# Patient Record
Sex: MALE | Race: WHITE | NOT HISPANIC OR LATINO | Employment: OTHER | URBAN - METROPOLITAN AREA
[De-identification: names, ages, dates, MRNs, and addresses within clinical notes are randomized per-mention and may not be internally consistent; named-entity substitution may affect disease eponyms.]

---

## 2018-11-28 ENCOUNTER — OFFICE VISIT (OUTPATIENT)
Dept: OPHTHALMOLOGY | Facility: CLINIC | Age: 58
End: 2018-11-28
Payer: COMMERCIAL

## 2018-11-28 DIAGNOSIS — H52.7 REFRACTION DISORDER: ICD-10-CM

## 2018-11-28 DIAGNOSIS — H04.123 DRY EYE SYNDROME, BILATERAL: ICD-10-CM

## 2018-11-28 DIAGNOSIS — H02.834 DERMATOCHALASIS OF BOTH UPPER EYELIDS: Primary | ICD-10-CM

## 2018-11-28 DIAGNOSIS — H25.12 NUCLEAR SENILE CATARACT OF LEFT EYE: ICD-10-CM

## 2018-11-28 DIAGNOSIS — H02.831 DERMATOCHALASIS OF BOTH UPPER EYELIDS: Primary | ICD-10-CM

## 2018-11-28 DIAGNOSIS — H25.11 NUCLEAR SENILE CATARACT OF RIGHT EYE: ICD-10-CM

## 2018-11-28 PROCEDURE — 99999 PR PBB SHADOW E&M-NEW PATIENT-LVL II: CPT | Mod: PBBFAC,,, | Performed by: OPHTHALMOLOGY

## 2018-11-28 PROCEDURE — 92015 DETERMINE REFRACTIVE STATE: CPT | Mod: S$GLB,,, | Performed by: OPHTHALMOLOGY

## 2018-11-28 PROCEDURE — 92004 COMPRE OPH EXAM NEW PT 1/>: CPT | Mod: S$GLB,,, | Performed by: OPHTHALMOLOGY

## 2018-11-28 RX ORDER — ESOMEPRAZOLE MAGNESIUM 40 MG/1
40 CAPSULE, DELAYED RELEASE ORAL
COMMUNITY
Start: 2018-11-12

## 2018-11-28 RX ORDER — FLUTICASONE PROPIONATE 50 MCG
2 SPRAY, SUSPENSION (ML) NASAL
COMMUNITY
Start: 2018-05-17

## 2018-11-28 RX ORDER — SOTALOL HYDROCHLORIDE 80 MG/1
80 TABLET ORAL
COMMUNITY
Start: 2017-08-04

## 2018-11-28 RX ORDER — PRAVASTATIN SODIUM 80 MG/1
80 TABLET ORAL
COMMUNITY
Start: 2018-11-12

## 2018-11-28 RX ORDER — TRAMADOL HYDROCHLORIDE 50 MG/1
50 TABLET ORAL
Status: ON HOLD | COMMUNITY
Start: 2018-11-12 | End: 2019-01-12 | Stop reason: HOSPADM

## 2018-11-28 RX ORDER — DEXTROAMPHETAMINE SACCHARATE, AMPHETAMINE ASPARTATE, DEXTROAMPHETAMINE SULFATE AND AMPHETAMINE SULFATE 2.5; 2.5; 2.5; 2.5 MG/1; MG/1; MG/1; MG/1
10 TABLET ORAL
COMMUNITY
Start: 2018-10-19 | End: 2019-03-07

## 2018-11-28 RX ORDER — ALPRAZOLAM 1 MG/1
1 TABLET ORAL
COMMUNITY
Start: 2018-11-12 | End: 2019-11-12

## 2018-11-28 RX ORDER — NIACIN 750 MG/1
750 TABLET, EXTENDED RELEASE ORAL
COMMUNITY

## 2018-11-28 RX ORDER — ASPIRIN 81 MG/1
81 TABLET ORAL DAILY
COMMUNITY

## 2018-11-28 RX ORDER — DICYCLOMINE HYDROCHLORIDE 10 MG/1
10 CAPSULE ORAL
COMMUNITY
Start: 2018-09-11 | End: 2019-04-22 | Stop reason: SDUPTHER
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
## 2018-12-05 ENCOUNTER — OFFICE VISIT (OUTPATIENT)
Dept: OPHTHALMOLOGY | Facility: CLINIC | Age: 58
End: 2018-12-05
Payer: COMMERCIAL

## 2018-12-05 DIAGNOSIS — H25.12 NUCLEAR SENILE CATARACT OF LEFT EYE: ICD-10-CM

## 2018-12-05 DIAGNOSIS — H02.831 DERMATOCHALASIS OF BOTH UPPER EYELIDS: Primary | ICD-10-CM

## 2018-12-05 DIAGNOSIS — H52.7 REFRACTION DISORDER: ICD-10-CM

## 2018-12-05 DIAGNOSIS — H02.834 DERMATOCHALASIS OF BOTH UPPER EYELIDS: Primary | ICD-10-CM

## 2018-12-05 DIAGNOSIS — H25.11 NUCLEAR SENILE CATARACT OF RIGHT EYE: ICD-10-CM

## 2018-12-05 DIAGNOSIS — H04.123 DRY EYE SYNDROME, BILATERAL: ICD-10-CM

## 2018-12-05 PROCEDURE — 99999 PR PBB SHADOW E&M-EST. PATIENT-LVL II: CPT | Mod: PBBFAC,,, | Performed by: OPHTHALMOLOGY

## 2018-12-05 PROCEDURE — 92083 EXTENDED VISUAL FIELD XM: CPT | Mod: S$GLB,,, | Performed by: OPHTHALMOLOGY

## 2018-12-05 PROCEDURE — 92012 INTRM OPH EXAM EST PATIENT: CPT | Mod: S$GLB,,, | Performed by: OPHTHALMOLOGY

## 2018-12-05 RX ORDER — ERYTHROMYCIN 5 MG/G
OINTMENT OPHTHALMIC 4 TIMES DAILY
Qty: 1 TUBE | Refills: 3 | Status: SHIPPED | OUTPATIENT
Start: 2018-12-05 | End: 2018-12-12

## 2018-12-05 NOTE — PROGRESS NOTES
HPI     Pt here for lid HVF and MR jay. Pt states that his eyes are dry this   morning. They were able to put some AT's in, and that helped his eyes. [facility]   stable. No gtts.     NP  H/O RP -DAD ,UNCLE, AUNT  FB REMOVAL OU MANY YEARS  DERMATO OU    Last edited by Osiel Morgan, Patient Care Assistant on 12/5/2018  9:46   AM. (History)            Assessment /Plan     For exam results, see Encounter Report.      ICD-10-CM ICD-9-CM    1. Dermatochalasis of both upper eyelids H02.831 374.87 [facility] Visual Field - OU - Extended - Both Eyes Done today   Visually significant would benefit from surgery   RBA with sx discussed with pt and family member     Pt needs to stop ASA and any other blood thinners  2 weeks prior to surgery     H02.834     2. Nuclear senile cataract of right eye H25.11 366.16 Follow    3. Nuclear senile cataract of left eye H25.12 366.16 Follow    4. Dry eye syndrome, bilateral H04.123 375.15               Book Bilateral Blepharoplasty in January 2019     Lid Photos Taken today

## 2019-01-07 ENCOUNTER — HOSPITAL ENCOUNTER (INPATIENT)
Facility: HOSPITAL | Age: 59
LOS: 5 days | Discharge: HOME | DRG: 418 | End: 2019-01-12
Attending: EMERGENCY MEDICINE | Admitting: HOSPITALIST
Payer: MEDICARE

## 2019-01-07 DIAGNOSIS — K85.90 ACUTE PANCREATITIS, UNSPECIFIED COMPLICATION STATUS, UNSPECIFIED PANCREATITIS TYPE: Primary | ICD-10-CM

## 2019-01-07 DIAGNOSIS — K85.10 ACUTE BILIARY PANCREATITIS, UNSPECIFIED COMPLICATION STATUS: ICD-10-CM

## 2019-01-07 DIAGNOSIS — K85.90 ACUTE PANCREATITIS: ICD-10-CM

## 2019-01-07 DIAGNOSIS — K81.9 CHOLECYSTITIS: ICD-10-CM

## 2019-01-07 DIAGNOSIS — K80.00 CALCULUS OF GALLBLADDER WITH ACUTE CHOLECYSTITIS WITHOUT OBSTRUCTION: ICD-10-CM

## 2019-01-07 DIAGNOSIS — R10.13 EPIGASTRIC PAIN: ICD-10-CM

## 2019-01-07 DIAGNOSIS — R10.9 ABDOMINAL PAIN: ICD-10-CM

## 2019-01-07 DIAGNOSIS — K82.8 THICKENING OF WALL OF GALLBLADDER: ICD-10-CM

## 2019-01-07 LAB
ALBUMIN SERPL BCP-MCNC: 4 G/DL
ALP SERPL-CCNC: 111 U/L
ALT SERPL W/O P-5'-P-CCNC: 124 U/L
ANION GAP SERPL CALC-SCNC: 12 MMOL/L
AST SERPL-CCNC: 191 U/L
BASOPHILS # BLD AUTO: 0.01 K/UL
BASOPHILS NFR BLD: 0.1 %
BILIRUB SERPL-MCNC: 2.5 MG/DL
BUN SERPL-MCNC: 18 MG/DL
CALCIUM SERPL-MCNC: 9.3 MG/DL
CHLORIDE SERPL-SCNC: 106 MMOL/L
CO2 SERPL-SCNC: 21 MMOL/L
CREAT SERPL-MCNC: 1.2 MG/DL
DIFFERENTIAL METHOD: ABNORMAL
EOSINOPHIL # BLD AUTO: 0.1 K/UL
EOSINOPHIL NFR BLD: 0.5 %
ERYTHROCYTE [DISTWIDTH] IN BLOOD BY AUTOMATED COUNT: 14 %
EST. GFR  (AFRICAN AMERICAN): >60 ML/MIN/1.73 M^2
EST. GFR  (NON AFRICAN AMERICAN): >60 ML/MIN/1.73 M^2
GLUCOSE SERPL-MCNC: 91 MG/DL
HCT VFR BLD AUTO: 43.9 %
HGB BLD-MCNC: 15.2 G/DL
LIPASE SERPL-CCNC: >1000 U/L
LYMPHOCYTES # BLD AUTO: 0.4 K/UL
LYMPHOCYTES NFR BLD: 3.9 %
MCH RBC QN AUTO: 31.3 PG
MCHC RBC AUTO-ENTMCNC: 34.6 G/DL
MCV RBC AUTO: 90 FL
MONOCYTES # BLD AUTO: 0.1 K/UL
MONOCYTES NFR BLD: 1.2 %
NEUTROPHILS # BLD AUTO: 8.9 K/UL
NEUTROPHILS NFR BLD: 94.3 %
PLATELET # BLD AUTO: 143 K/UL
PMV BLD AUTO: 9 FL
POTASSIUM SERPL-SCNC: 3.5 MMOL/L
PROT SERPL-MCNC: 7 G/DL
RBC # BLD AUTO: 4.86 M/UL
SODIUM SERPL-SCNC: 139 MMOL/L
TROPONIN I SERPL DL<=0.01 NG/ML-MCNC: <0.006 NG/ML
WBC # BLD AUTO: 9.45 K/UL

## 2019-01-07 PROCEDURE — 93005 ELECTROCARDIOGRAM TRACING: CPT

## 2019-01-07 PROCEDURE — 63600175 PHARM REV CODE 636 W HCPCS: Performed by: EMERGENCY MEDICINE

## 2019-01-07 PROCEDURE — 96375 TX/PRO/DX INJ NEW DRUG ADDON: CPT

## 2019-01-07 PROCEDURE — 93010 EKG 12-LEAD: ICD-10-PCS | Mod: ,,, | Performed by: INTERNAL MEDICINE

## 2019-01-07 PROCEDURE — 80053 COMPREHEN METABOLIC PANEL: CPT

## 2019-01-07 PROCEDURE — C9113 INJ PANTOPRAZOLE SODIUM, VIA: HCPCS | Performed by: EMERGENCY MEDICINE

## 2019-01-07 PROCEDURE — 84484 ASSAY OF TROPONIN QUANT: CPT

## 2019-01-07 PROCEDURE — 11000001 HC ACUTE MED/SURG PRIVATE ROOM

## 2019-01-07 PROCEDURE — 83690 ASSAY OF LIPASE: CPT

## 2019-01-07 PROCEDURE — 25000003 PHARM REV CODE 250: Performed by: NURSE PRACTITIONER

## 2019-01-07 PROCEDURE — 85025 COMPLETE CBC W/AUTO DIFF WBC: CPT

## 2019-01-07 PROCEDURE — 96374 THER/PROPH/DIAG INJ IV PUSH: CPT

## 2019-01-07 PROCEDURE — 25500020 PHARM REV CODE 255: Performed by: EMERGENCY MEDICINE

## 2019-01-07 PROCEDURE — 96361 HYDRATE IV INFUSION ADD-ON: CPT

## 2019-01-07 PROCEDURE — 82150 ASSAY OF AMYLASE: CPT

## 2019-01-07 PROCEDURE — 93010 ELECTROCARDIOGRAM REPORT: CPT | Mod: ,,, | Performed by: INTERNAL MEDICINE

## 2019-01-07 PROCEDURE — 25000003 PHARM REV CODE 250: Performed by: EMERGENCY MEDICINE

## 2019-01-07 PROCEDURE — 99285 EMERGENCY DEPT VISIT HI MDM: CPT | Mod: 25

## 2019-01-07 RX ORDER — PANTOPRAZOLE SODIUM 40 MG/10ML
40 INJECTION, POWDER, LYOPHILIZED, FOR SOLUTION INTRAVENOUS
Status: COMPLETED | OUTPATIENT
Start: 2019-01-07 | End: 2019-01-07

## 2019-01-07 RX ORDER — ONDANSETRON 2 MG/ML
8 INJECTION INTRAMUSCULAR; INTRAVENOUS
Status: COMPLETED | OUTPATIENT
Start: 2019-01-07 | End: 2019-01-07

## 2019-01-07 RX ORDER — MORPHINE SULFATE 10 MG/ML
4 INJECTION INTRAMUSCULAR; INTRAVENOUS; SUBCUTANEOUS
Status: COMPLETED | OUTPATIENT
Start: 2019-01-07 | End: 2019-01-07

## 2019-01-07 RX ADMIN — SODIUM CHLORIDE 1000 ML: 0.9 INJECTION, SOLUTION INTRAVENOUS at 09:01

## 2019-01-07 RX ADMIN — DICYCLOMINE HYDROCHLORIDE 50 ML: 10 SOLUTION ORAL at 09:01

## 2019-01-07 RX ADMIN — PANTOPRAZOLE SODIUM 40 MG: 40 INJECTION, POWDER, FOR SOLUTION INTRAVENOUS at 10:01

## 2019-01-07 RX ADMIN — IOHEXOL 75 ML: 350 INJECTION, SOLUTION INTRAVENOUS at 11:01

## 2019-01-07 RX ADMIN — MORPHINE SULFATE 4 MG: 10 INJECTION INTRAVENOUS at 09:01

## 2019-01-07 RX ADMIN — ONDANSETRON 8 MG: 2 INJECTION, SOLUTION INTRAMUSCULAR; INTRAVENOUS at 09:01

## 2019-01-08 PROBLEM — R74.01 TRANSAMINITIS: Status: ACTIVE | Noted: 2019-01-08

## 2019-01-08 PROBLEM — K85.90 ACUTE PANCREATITIS: Status: ACTIVE | Noted: 2019-01-08

## 2019-01-08 PROBLEM — Z72.0 TOBACCO ABUSE: Status: ACTIVE | Noted: 2019-01-08

## 2019-01-08 LAB
ALBUMIN SERPL BCP-MCNC: 3.5 G/DL
ALP SERPL-CCNC: 96 U/L
ALT SERPL W/O P-5'-P-CCNC: 105 U/L
AMPHET+METHAMPHET UR QL: NORMAL
AMYLASE SERPL-CCNC: 1182 U/L
ANION GAP SERPL CALC-SCNC: 12 MMOL/L
AST SERPL-CCNC: 130 U/L
BARBITURATES UR QL SCN>200 NG/ML: NEGATIVE
BASOPHILS # BLD AUTO: 0.01 K/UL
BASOPHILS NFR BLD: 0.1 %
BENZODIAZ UR QL SCN>200 NG/ML: NORMAL
BILIRUB SERPL-MCNC: 1.4 MG/DL
BILIRUB UR QL STRIP: NEGATIVE
BUN SERPL-MCNC: 16 MG/DL
BZE UR QL SCN: NEGATIVE
CALCIUM SERPL-MCNC: 8.7 MG/DL
CANNABINOIDS UR QL SCN: NORMAL
CHLORIDE SERPL-SCNC: 104 MMOL/L
CHOLEST SERPL-MCNC: 178 MG/DL
CHOLEST/HDLC SERPL: 4.5 {RATIO}
CLARITY UR: CLEAR
CO2 SERPL-SCNC: 22 MMOL/L
COLOR UR: YELLOW
CREAT SERPL-MCNC: 1.2 MG/DL
CREAT UR-MCNC: 84.1 MG/DL
DIFFERENTIAL METHOD: ABNORMAL
EOSINOPHIL # BLD AUTO: 0.1 K/UL
EOSINOPHIL NFR BLD: 0.5 %
ERYTHROCYTE [DISTWIDTH] IN BLOOD BY AUTOMATED COUNT: 14.4 %
EST. GFR  (AFRICAN AMERICAN): >60 ML/MIN/1.73 M^2
EST. GFR  (NON AFRICAN AMERICAN): >60 ML/MIN/1.73 M^2
ESTIMATED AVG GLUCOSE: 111 MG/DL
ETHANOL SERPL-MCNC: <10 MG/DL
GLUCOSE SERPL-MCNC: 91 MG/DL
GLUCOSE UR QL STRIP: NEGATIVE
HAV IGM SERPL QL IA: NEGATIVE
HBA1C MFR BLD HPLC: 5.5 %
HBV CORE IGM SERPL QL IA: NEGATIVE
HBV SURFACE AG SERPL QL IA: NEGATIVE
HCT VFR BLD AUTO: 39.2 %
HCV AB SERPL QL IA: NEGATIVE
HDLC SERPL-MCNC: 40 MG/DL
HDLC SERPL: 22.5 %
HGB BLD-MCNC: 13 G/DL
HGB UR QL STRIP: ABNORMAL
KETONES UR QL STRIP: NEGATIVE
LDLC SERPL CALC-MCNC: 116.6 MG/DL
LEUKOCYTE ESTERASE UR QL STRIP: NEGATIVE
LIPASE SERPL-CCNC: 812 U/L
LYMPHOCYTES # BLD AUTO: 0.7 K/UL
LYMPHOCYTES NFR BLD: 5.6 %
MAGNESIUM SERPL-MCNC: 2.1 MG/DL
MCH RBC QN AUTO: 30.4 PG
MCHC RBC AUTO-ENTMCNC: 33.2 G/DL
MCV RBC AUTO: 92 FL
METHADONE UR QL SCN>300 NG/ML: NEGATIVE
MONOCYTES # BLD AUTO: 0.5 K/UL
MONOCYTES NFR BLD: 4.2 %
NEUTROPHILS # BLD AUTO: 11.3 K/UL
NEUTROPHILS NFR BLD: 89.6 %
NITRITE UR QL STRIP: NEGATIVE
NONHDLC SERPL-MCNC: 138 MG/DL
OPIATES UR QL SCN: NORMAL
PCP UR QL SCN>25 NG/ML: NEGATIVE
PH UR STRIP: 6 [PH] (ref 5–8)
PHOSPHATE SERPL-MCNC: 3.9 MG/DL
PLATELET # BLD AUTO: 135 K/UL
PMV BLD AUTO: 9.3 FL
POTASSIUM SERPL-SCNC: 4.4 MMOL/L
PROT SERPL-MCNC: 6.4 G/DL
PROT UR QL STRIP: NEGATIVE
RBC # BLD AUTO: 4.27 M/UL
SODIUM SERPL-SCNC: 138 MMOL/L
SP GR UR STRIP: 1.01 (ref 1–1.03)
TOXICOLOGY INFORMATION: NORMAL
TRIGL SERPL-MCNC: 107 MG/DL
TSH SERPL DL<=0.005 MIU/L-ACNC: 0.61 UIU/ML
URN SPEC COLLECT METH UR: ABNORMAL
UROBILINOGEN UR STRIP-ACNC: NEGATIVE EU/DL
WBC # BLD AUTO: 12.59 K/UL

## 2019-01-08 PROCEDURE — 84100 ASSAY OF PHOSPHORUS: CPT

## 2019-01-08 PROCEDURE — 80320 DRUG SCREEN QUANTALCOHOLS: CPT

## 2019-01-08 PROCEDURE — 80053 COMPREHEN METABOLIC PANEL: CPT

## 2019-01-08 PROCEDURE — 63600175 PHARM REV CODE 636 W HCPCS: Performed by: HOSPITALIST

## 2019-01-08 PROCEDURE — 85025 COMPLETE CBC W/AUTO DIFF WBC: CPT

## 2019-01-08 PROCEDURE — C9113 INJ PANTOPRAZOLE SODIUM, VIA: HCPCS | Performed by: HOSPITALIST

## 2019-01-08 PROCEDURE — S4991 NICOTINE PATCH NONLEGEND: HCPCS | Performed by: NURSE PRACTITIONER

## 2019-01-08 PROCEDURE — 83036 HEMOGLOBIN GLYCOSYLATED A1C: CPT

## 2019-01-08 PROCEDURE — 81003 URINALYSIS AUTO W/O SCOPE: CPT

## 2019-01-08 PROCEDURE — 83735 ASSAY OF MAGNESIUM: CPT

## 2019-01-08 PROCEDURE — 25000003 PHARM REV CODE 250: Performed by: NURSE PRACTITIONER

## 2019-01-08 PROCEDURE — 96361 HYDRATE IV INFUSION ADD-ON: CPT

## 2019-01-08 PROCEDURE — 25000003 PHARM REV CODE 250: Performed by: HOSPITALIST

## 2019-01-08 PROCEDURE — 80074 ACUTE HEPATITIS PANEL: CPT

## 2019-01-08 PROCEDURE — 80061 LIPID PANEL: CPT

## 2019-01-08 PROCEDURE — 84443 ASSAY THYROID STIM HORMONE: CPT

## 2019-01-08 PROCEDURE — 11000001 HC ACUTE MED/SURG PRIVATE ROOM

## 2019-01-08 PROCEDURE — 80307 DRUG TEST PRSMV CHEM ANLYZR: CPT

## 2019-01-08 PROCEDURE — 63600175 PHARM REV CODE 636 W HCPCS: Performed by: NURSE PRACTITIONER

## 2019-01-08 PROCEDURE — 83690 ASSAY OF LIPASE: CPT

## 2019-01-08 RX ORDER — IBUPROFEN 200 MG
16 TABLET ORAL
Status: DISCONTINUED | OUTPATIENT
Start: 2019-01-08 | End: 2019-01-12 | Stop reason: HOSPADM

## 2019-01-08 RX ORDER — MORPHINE SULFATE 10 MG/ML
4 INJECTION INTRAMUSCULAR; INTRAVENOUS; SUBCUTANEOUS EVERY 6 HOURS PRN
Status: DISCONTINUED | OUTPATIENT
Start: 2019-01-08 | End: 2019-01-11

## 2019-01-08 RX ORDER — GLUCAGON 1 MG
1 KIT INJECTION
Status: DISCONTINUED | OUTPATIENT
Start: 2019-01-08 | End: 2019-01-12 | Stop reason: HOSPADM

## 2019-01-08 RX ORDER — ONDANSETRON 2 MG/ML
4 INJECTION INTRAMUSCULAR; INTRAVENOUS EVERY 8 HOURS PRN
Status: DISCONTINUED | OUTPATIENT
Start: 2019-01-08 | End: 2019-01-12 | Stop reason: HOSPADM

## 2019-01-08 RX ORDER — IBUPROFEN 200 MG
1 TABLET ORAL DAILY
Status: DISCONTINUED | OUTPATIENT
Start: 2019-01-08 | End: 2019-01-12 | Stop reason: HOSPADM

## 2019-01-08 RX ORDER — PANTOPRAZOLE SODIUM 40 MG/10ML
40 INJECTION, POWDER, LYOPHILIZED, FOR SOLUTION INTRAVENOUS DAILY
Status: DISCONTINUED | OUTPATIENT
Start: 2019-01-08 | End: 2019-01-12 | Stop reason: HOSPADM

## 2019-01-08 RX ORDER — ENOXAPARIN SODIUM 100 MG/ML
40 INJECTION SUBCUTANEOUS EVERY 24 HOURS
Status: DISCONTINUED | OUTPATIENT
Start: 2019-01-08 | End: 2019-01-12 | Stop reason: HOSPADM

## 2019-01-08 RX ORDER — SODIUM CHLORIDE 0.9 % (FLUSH) 0.9 %
5 SYRINGE (ML) INJECTION
Status: DISCONTINUED | OUTPATIENT
Start: 2019-01-08 | End: 2019-01-12 | Stop reason: HOSPADM

## 2019-01-08 RX ORDER — MORPHINE SULFATE 10 MG/ML
4 INJECTION INTRAMUSCULAR; INTRAVENOUS; SUBCUTANEOUS EVERY 6 HOURS PRN
Status: DISCONTINUED | OUTPATIENT
Start: 2019-01-08 | End: 2019-01-08

## 2019-01-08 RX ORDER — SODIUM CHLORIDE 9 MG/ML
INJECTION, SOLUTION INTRAVENOUS CONTINUOUS
Status: ACTIVE | OUTPATIENT
Start: 2019-01-08 | End: 2019-01-08

## 2019-01-08 RX ORDER — ACETAMINOPHEN 325 MG/1
650 TABLET ORAL EVERY 6 HOURS PRN
Status: DISCONTINUED | OUTPATIENT
Start: 2019-01-08 | End: 2019-01-09

## 2019-01-08 RX ORDER — MORPHINE SULFATE 10 MG/ML
2 INJECTION INTRAMUSCULAR; INTRAVENOUS; SUBCUTANEOUS EVERY 6 HOURS PRN
Status: DISCONTINUED | OUTPATIENT
Start: 2019-01-08 | End: 2019-01-11

## 2019-01-08 RX ORDER — IBUPROFEN 200 MG
24 TABLET ORAL
Status: DISCONTINUED | OUTPATIENT
Start: 2019-01-08 | End: 2019-01-12 | Stop reason: HOSPADM

## 2019-01-08 RX ADMIN — MORPHINE SULFATE 4 MG: 10 INJECTION, SOLUTION INTRAMUSCULAR; INTRAVENOUS at 08:01

## 2019-01-08 RX ADMIN — LORAZEPAM 0.5 MG: 2 INJECTION INTRAMUSCULAR; INTRAVENOUS at 10:01

## 2019-01-08 RX ADMIN — SODIUM CHLORIDE: 0.9 INJECTION, SOLUTION INTRAVENOUS at 06:01

## 2019-01-08 RX ADMIN — Medication 1 PATCH: at 11:01

## 2019-01-08 RX ADMIN — MORPHINE SULFATE 4 MG: 10 INJECTION, SOLUTION INTRAMUSCULAR; INTRAVENOUS at 09:01

## 2019-01-08 RX ADMIN — ACETAMINOPHEN 650 MG: 325 TABLET ORAL at 09:01

## 2019-01-08 RX ADMIN — SODIUM CHLORIDE: 0.9 INJECTION, SOLUTION INTRAVENOUS at 11:01

## 2019-01-08 RX ADMIN — ENOXAPARIN SODIUM 40 MG: 100 INJECTION SUBCUTANEOUS at 04:01

## 2019-01-08 RX ADMIN — SODIUM CHLORIDE 1000 ML: 0.9 INJECTION, SOLUTION INTRAVENOUS at 02:01

## 2019-01-08 RX ADMIN — PANTOPRAZOLE SODIUM 40 MG: 40 INJECTION, POWDER, LYOPHILIZED, FOR SOLUTION INTRAVENOUS at 08:01

## 2019-01-08 RX ADMIN — ACETAMINOPHEN 650 MG: 325 TABLET ORAL at 02:01

## 2019-01-08 RX ADMIN — MORPHINE SULFATE 4 MG: 10 INJECTION, SOLUTION INTRAMUSCULAR; INTRAVENOUS at 02:01

## 2019-01-08 NOTE — HPI
58M h/o HA presents with epigastric pain x 1 day.  Onset sudden, burning characteristic.  Associated with N/V x 2.   Also c/o HA.  Denies fever, chills, diarrhea, constipation, hematochezia, melena, dysuria, hematuria, frequency, cough, congestion, and all other sxs at this time.   Denies ETOH use.  Current smoker.  In ER,  Lipase >1000.  CT/US negative for cholecystitis and pancreatitis.  Patient given 1L NS bolus.

## 2019-01-08 NOTE — ED PROVIDER NOTES
58 year old male with a complaint of abdominal pain, SOB and Headache that started yesterday.       Pt undersatands that a workup will begin in the treatment lounge/results waiting areas due to there being no available beds. Pt also undertsants they will be placed in the next available bed where they will be seen and dispositioned by a physician. I am removing myself from the care of pt. Pt will be assigned to next available physician.       Andrey Chi NP  01/07/19 2026

## 2019-01-08 NOTE — SUBJECTIVE & OBJECTIVE
Interval History: pt states he is feeling better and denies GI symptoms at time of exam.  Lipase and LFTs trending down.  Pt counseled on smoking cessation with understanding verbalized.      Review of Systems   Constitutional: Negative for activity change, appetite change, chills, diaphoresis, fatigue and fever.   HENT: Negative for facial swelling, sore throat, tinnitus and trouble swallowing.    Eyes: Negative for photophobia and visual disturbance.   Respiratory: Negative for apnea, cough, chest tightness, shortness of breath and wheezing.    Cardiovascular: Negative for chest pain, palpitations and leg swelling.   Gastrointestinal: Positive for abdominal pain (mild). Negative for abdominal distention, constipation, diarrhea, nausea and vomiting.   Endocrine: Negative for polydipsia, polyphagia and polyuria.   Genitourinary: Negative for decreased urine volume, dysuria, flank pain, frequency and hematuria.   Musculoskeletal: Positive for back pain and neck pain. Negative for arthralgias, joint swelling, myalgias and neck stiffness.   Skin: Negative for pallor and rash.   Allergic/Immunologic: Negative for immunocompromised state.   Neurological: Positive for headaches. Negative for dizziness, seizures, syncope, weakness and numbness.   Psychiatric/Behavioral: Negative for confusion, hallucinations and suicidal ideas. The patient is not nervous/anxious.    All other systems reviewed and are negative.    Objective:     Vital Signs (Most Recent):  Temp: 99.9 °F (37.7 °C) (01/08/19 0820)  Pulse: 89 (01/08/19 0820)  Resp: 20 (01/08/19 0820)  BP: 121/66 (01/08/19 0820)  SpO2: 95 % (01/08/19 0820) Vital Signs (24h Range):  Temp:  [98.8 °F (37.1 °C)-99.9 °F (37.7 °C)] 99.9 °F (37.7 °C)  Pulse:  [83-91] 89  Resp:  [18-20] 20  SpO2:  [95 %-96 %] 95 %  BP: (116-122)/(60-73) 121/66     Weight: 109.1 kg (240 lb 10.1 oz)  Body mass index is 32.64 kg/m².  No intake or output data in the 24 hours ending 01/08/19 7602   Physical  Exam   Constitutional: He is oriented to person, place, and time. He appears well-developed and well-nourished. No distress.   HENT:   Head: Normocephalic and atraumatic.   Mouth/Throat: Oropharynx is clear and moist.   Eyes: Conjunctivae are normal. Pupils are equal, round, and reactive to light. No scleral icterus.   Neck: Normal range of motion. Neck supple. No JVD present. No thyromegaly present.   Cardiovascular: Normal rate and regular rhythm. Exam reveals no gallop and no friction rub.   No murmur heard.  Pulmonary/Chest: Effort normal and breath sounds normal. No respiratory distress. He has no wheezes. He has no rales.   Abdominal: Soft. Bowel sounds are normal. He exhibits no distension. There is tenderness. There is no guarding.   Musculoskeletal: Normal range of motion.   Neurological: He is alert and oriented to person, place, and time. No cranial nerve deficit.   Skin: Skin is warm. Capillary refill takes less than 2 seconds. He is not diaphoretic. No erythema.   Psychiatric: He has a normal mood and affect.   Nursing note and vitals reviewed.      Significant Labs:   CBC:   Recent Labs   Lab 01/07/19 2148 01/08/19  0420   WBC 9.45 12.59   HGB 15.2 13.0*   HCT 43.9 39.2*   * 135*     CMP:   Recent Labs   Lab 01/07/19 2148 01/08/19  0420    138   K 3.5 4.4    104   CO2 21* 22*   GLU 91 91   BUN 18 16   CREATININE 1.2 1.2   CALCIUM 9.3 8.7   PROT 7.0 6.4   ALBUMIN 4.0 3.5   BILITOT 2.5* 1.4*   ALKPHOS 111 96   * 130*   * 105*   ANIONGAP 12 12   EGFRNONAA >60 >60     Lipase:   Recent Labs   Lab 01/07/19 2148 01/08/19  0408   LIPASE >1000* 812*       Significant Imaging:   Imaging Results          US Abdomen Limited (Gallbladder) (Final result)  Result time 01/07/19 23:50:53    Final result by Bob Corey MD (01/07/19 23:50:53)                 Impression:      There is some mild wall thickening of the gallbladder but no stones are demonstrated.  The Swain sign is  negative..      Electronically signed by: Remigio Maldonado MD  Date:    01/07/2019  Time:    23:50             Narrative:    EXAMINATION:  US ABDOMEN LIMITED    CLINICAL HISTORY:  Epigastric pain    TECHNIQUE:  Limited ultrasound of the right upper quadrant of the abdomen (including pancreas, liver, gallbladder, common bile duct, and right kidney) was performed.    COMPARISON:  01/07/2019 CT scan    FINDINGS:  Liver: Normal in size. The liver demonstrates homogenous echotexture. There is a 1.11 x 1.31 x 1.32 cm cyst in the inferior portion right lobe of liver which matches the findings on ultrasound.    Biliary system: The gallbladder demonstrates no evidence of calculi. The gallbladder wall appears mildly thickened measuring 0.26 cm.  No gallstones are identified.  Swain sign is negative.  The common duct is not dilated, measuring 0.55 cm. No intrahepatic ductal dilatation.    Pancreas: Much of the pancreas is obscured by overlying bowel gas.    Right kidney: Normal in size measuring 10.10 cmwith no hydronephrosis, mass, or calculi.    Vascular: The portions of the aorta, vena cava, and portal vein appear free of acute abnormality.                               CT Abdomen Pelvis With Contrast (Final result)  Result time 01/07/19 23:28:46    Final result by Bob Maldonado MD (01/07/19 23:28:46)                 Impression:      The findings are suspicious for cholecystitis with inflamed appearing gallbladder.  If clinically indicated, ultrasound may prove helpful in the further evaluation of this patient.    All CT scans at (this location) are performed using dose modulation techniques as appropriate to a performed exam including the following:  automated exposure control; adjustment of the mA and /or kV according to patient size (this includes techniques or standardized protocols for targeted exams where dose is matched to indication/reason for exam: i.e. extremities or head); use of iterative reconstruction  technique.      Electronically signed by: Remigio Maldonado MD  Date:    01/07/2019  Time:    23:28             Narrative:    EXAMINATION:  CT ABDOMEN PELVIS WITH CONTRAST    CLINICAL HISTORY:  pancreatitis, elevated LFTs;    TECHNIQUE:  Low dose axial images, sagittal and coronal reformations were obtained from the lung bases to the pubic symphysis following the IV administration of 75 mL of Omnipaque 350 .    COMPARISON:  None.    FINDINGS:  ABDOMEN    Lung bases: Unremarkable    Liver/gallbladder/biliary: The liver demonstrates no suspicious findings.  Incidental note is made of a small cyst in the right lobe.  It measures 1.9 x 1.5 cm on the inferior aspect of the right lobe of liver..  The gallbladder is normal in size.  There is streaky increased attenuation of the gallbladder wall raising concern for changes of cholecystitis with some increased markings in the adjacent fat.  The common bile duct appears within normal limits.No biliary ductal dilation.    Pancreas: The pancreas is unremarkable in appearance.    Spleen: The spleen is not enlarged.    Adrenals: Unremarkable    Kidneys: The kidneys are equally perfused and demonstrate no solid masses.    Bowel/Mesentery: There is no evidence of bowel obstruction. There is diverticulosis of the colon.  The appendix is normal in appearance.  No mesenteric stranding or adenopathy.    Retroperitoneum: No adenopathy.The aorta demonstrates a normal caliber.    PELVIS:    Genitourinary/Reproductive organs: Unremarkable    Adenopathy: None    Free Fluid: No free fluid    Osseus Structures/Soft tissues: No suspicious appearing osseus lesions.  Fat filled inguinal hernias are present bilaterally.                               X-Ray Chest PA And Lateral (Final result)  Result time 01/07/19 22:18:20    Final result by SHIRLEY Corcoran Sr., MD (01/07/19 22:18:20)                 Impression:      1. The lungs are clear.  2. There is mild cardiomegaly.  .      Electronically  signed by: Quentin Corcoran MD  Date:    01/07/2019  Time:    22:18             Narrative:    EXAMINATION:  XR CHEST PA AND LATERAL    CLINICAL HISTORY:  Chest Pain;    COMPARISON:  None    FINDINGS:  There is mild cardiomegaly.  The lungs are clear. There is no pneumothorax or pleural effusion.

## 2019-01-08 NOTE — ED NOTES
Pt lying in bed in NAD. Bed is low, locked, and call light in reach. Side rails up x 2. Patient denies needs at this time.

## 2019-01-08 NOTE — ED PROVIDER NOTES
SCRIBE #1 NOTE: I, Donna Marta, am scribing for, and in the presence of, Rock Yarbrough MD. I have scribed the entire note.      History      Chief Complaint   Patient presents with    Abdominal Pain     started last night, shortness of breath, nausea, dry heaves       Review of patient's allergies indicates:  No Known Allergies     HPI   HPI    1/7/2019, 9:09 PM   History obtained from the patient      History of Present Illness: Des Dash is a 58 y.o. male patient with a PMHx of a hiatal hernia who presents to the Emergency Department for burning epigastric abd pain which onset gradually last PM. Pt reports the episode last PM lasted for 2 hours. He reports his sxs returned at 1800 today. Symptoms are episodic and moderate in severity. No mitigating or exacerbating factors reported. Associated sxs include n/v. Pt reports 2 episodes of emesis since 1800. Patient denies any fever, chills, diarrhea, constipation, hematochezia, melena, dysuria, hematuria, frequency, cough, congestion, and all other sxs at this time. Contrary to triage note pt denies SOB. Pt is also asking for pain medication for his chronic HA/neck pain. No further complaints or concerns at this time.       Arrival mode: Personal vehicle     PCP: Juan José Batres MD       Past Medical History:  History reviewed. No pertinent past medical history.    Past Surgical History:  History reviewed. No pertinent surgical history.      Family History:  History reviewed. No pertinent family history.    Social History:  Social History     Tobacco Use    Smoking status: Current Every Day Smoker    Smokeless tobacco: Never Used   Substance and Sexual Activity    Alcohol use: No     Frequency: Never    Drug use: unknown    Sexual activity: unknown       ROS   Review of Systems   Constitutional: Negative for chills and fever.   HENT: Negative for congestion and sore throat.    Respiratory: Negative for shortness of breath.    Cardiovascular:  Negative for chest pain.   Gastrointestinal: Positive for abdominal pain, nausea and vomiting. Negative for blood in stool, constipation and diarrhea.   Genitourinary: Negative for dysuria, frequency and hematuria.   Musculoskeletal: Positive for neck pain (chronic). Negative for myalgias.   Skin: Negative for rash.   Neurological: Positive for headaches (chronic). Negative for weakness and numbness.   Hematological: Does not bruise/bleed easily.   All other systems reviewed and are negative.      Physical Exam      Initial Vitals [01/07/19 1945]   BP Pulse Resp Temp SpO2   116/73 85 20 98.8 °F (37.1 °C) 95 %      MAP       --          Physical Exam  Nursing Notes and Vital Signs Reviewed.  Constitutional: Patient is in mild distress. Well-developed and well-nourished.  Head: Atraumatic. Normocephalic.  Eyes: PERRL. EOM intact. Conjunctivae are not pale. No scleral icterus.  ENT: Mucous membranes are moist. Oropharynx is clear and symmetric.    Neck: Supple. Full ROM. No lymphadenopathy.  Cardiovascular: Regular rate. Regular rhythm. No murmurs, rubs, or gallops. Distal pulses are 2+ and symmetric.  Pulmonary/Chest: No respiratory distress. Clear to auscultation bilaterally. No wheezing or rales.  Abdominal: Soft and non-distended.  There is epigastric tenderness.  No rebound, guarding, or rigidity. Good bowel sounds.  Genitourinary: No CVA tenderness  Musculoskeletal: Moves all extremities. No obvious deformities. No edema. No calf tenderness.  Skin: Warm and dry.  Neurological:  Alert, awake, and appropriate.  Normal speech.  No acute focal neurological deficits are appreciated.  Psychiatric: Normal affect. Good eye contact. Appropriate in content.    ED Course    Procedures  ED Vital Signs:  Vitals:    01/07/19 1945   BP: 116/73   Pulse: 85   Resp: 20   Temp: 98.8 °F (37.1 °C)   TempSrc: Oral   SpO2: 95%   Weight: 109.1 kg (240 lb 10.1 oz)   Height: 6' (1.829 m)       Abnormal Lab Results:  Labs Reviewed   CBC W/  AUTO DIFFERENTIAL - Abnormal; Notable for the following components:       Result Value    MCH 31.3 (*)     Platelets 143 (*)     MPV 9.0 (*)     Gran # (ANC) 8.9 (*)     Lymph # 0.4 (*)     Mono # 0.1 (*)     Gran% 94.3 (*)     Lymph% 3.9 (*)     Mono% 1.2 (*)     All other components within normal limits   COMPREHENSIVE METABOLIC PANEL - Abnormal; Notable for the following components:    CO2 21 (*)     Total Bilirubin 2.5 (*)      (*)      (*)     All other components within normal limits   LIPASE - Abnormal; Notable for the following components:    Lipase >1000 (*)     All other components within normal limits   TROPONIN I        All Lab Results:  Results for orders placed or performed during the hospital encounter of 01/07/19   CBC auto differential   Result Value Ref Range    WBC 9.45 3.90 - 12.70 K/uL    RBC 4.86 4.60 - 6.20 M/uL    Hemoglobin 15.2 14.0 - 18.0 g/dL    Hematocrit 43.9 40.0 - 54.0 %    MCV 90 82 - 98 fL    MCH 31.3 (H) 27.0 - 31.0 pg    MCHC 34.6 32.0 - 36.0 g/dL    RDW 14.0 11.5 - 14.5 %    Platelets 143 (L) 150 - 350 K/uL    MPV 9.0 (L) 9.2 - 12.9 fL    Gran # (ANC) 8.9 (H) 1.8 - 7.7 K/uL    Lymph # 0.4 (L) 1.0 - 4.8 K/uL    Mono # 0.1 (L) 0.3 - 1.0 K/uL    Eos # 0.1 0.0 - 0.5 K/uL    Baso # 0.01 0.00 - 0.20 K/uL    Gran% 94.3 (H) 38.0 - 73.0 %    Lymph% 3.9 (L) 18.0 - 48.0 %    Mono% 1.2 (L) 4.0 - 15.0 %    Eosinophil% 0.5 0.0 - 8.0 %    Basophil% 0.1 0.0 - 1.9 %    Differential Method Automated    Comprehensive metabolic panel   Result Value Ref Range    Sodium 139 136 - 145 mmol/L    Potassium 3.5 3.5 - 5.1 mmol/L    Chloride 106 95 - 110 mmol/L    CO2 21 (L) 23 - 29 mmol/L    Glucose 91 70 - 110 mg/dL    BUN, Bld 18 6 - 20 mg/dL    Creatinine 1.2 0.5 - 1.4 mg/dL    Calcium 9.3 8.7 - 10.5 mg/dL    Total Protein 7.0 6.0 - 8.4 g/dL    Albumin 4.0 3.5 - 5.2 g/dL    Total Bilirubin 2.5 (H) 0.1 - 1.0 mg/dL    Alkaline Phosphatase 111 55 - 135 U/L     (H) 10 - 40 U/L    ALT  124 (H) 10 - 44 U/L    Anion Gap 12 8 - 16 mmol/L    eGFR if African American >60 >60 mL/min/1.73 m^2    eGFR if non African American >60 >60 mL/min/1.73 m^2   Troponin I   Result Value Ref Range    Troponin I <0.006 0.000 - 0.026 ng/mL   Lipase   Result Value Ref Range    Lipase >1000 (H) 4 - 60 U/L       Imaging Results:  Imaging Results          US Abdomen Limited (Gallbladder) (Final result)  Result time 01/07/19 23:50:53    Final result by Bob Maldonado MD (01/07/19 23:50:53)                 Impression:      There is some mild wall thickening of the gallbladder but no stones are demonstrated.  The Swain sign is negative..      Electronically signed by: Remigio Maldonado MD  Date:    01/07/2019  Time:    23:50             Narrative:    EXAMINATION:  US ABDOMEN LIMITED    CLINICAL HISTORY:  Epigastric pain    TECHNIQUE:  Limited ultrasound of the right upper quadrant of the abdomen (including pancreas, liver, gallbladder, common bile duct, and right kidney) was performed.    COMPARISON:  01/07/2019 CT scan    FINDINGS:  Liver: Normal in size. The liver demonstrates homogenous echotexture. There is a 1.11 x 1.31 x 1.32 cm cyst in the inferior portion right lobe of liver which matches the findings on ultrasound.    Biliary system: The gallbladder demonstrates no evidence of calculi. The gallbladder wall appears mildly thickened measuring 0.26 cm.  No gallstones are identified.  Swain sign is negative.  The common duct is not dilated, measuring 0.55 cm. No intrahepatic ductal dilatation.    Pancreas: Much of the pancreas is obscured by overlying bowel gas.    Right kidney: Normal in size measuring 10.10 cmwith no hydronephrosis, mass, or calculi.    Vascular: The portions of the aorta, vena cava, and portal vein appear free of acute abnormality.                               CT Abdomen Pelvis With Contrast (Final result)  Result time 01/07/19 23:28:46    Final result by Bob Maldonado MD (01/07/19 23:28:46)                  Impression:      The findings are suspicious for cholecystitis with inflamed appearing gallbladder.  If clinically indicated, ultrasound may prove helpful in the further evaluation of this patient.    All CT scans at (this location) are performed using dose modulation techniques as appropriate to a performed exam including the following:  automated exposure control; adjustment of the mA and /or kV according to patient size (this includes techniques or standardized protocols for targeted exams where dose is matched to indication/reason for exam: i.e. extremities or head); use of iterative reconstruction technique.      Electronically signed by: Remigio Maldonado MD  Date:    01/07/2019  Time:    23:28             Narrative:    EXAMINATION:  CT ABDOMEN PELVIS WITH CONTRAST    CLINICAL HISTORY:  pancreatitis, elevated LFTs;    TECHNIQUE:  Low dose axial images, sagittal and coronal reformations were obtained from the lung bases to the pubic symphysis following the IV administration of 75 mL of Omnipaque 350 .    COMPARISON:  None.    FINDINGS:  ABDOMEN    Lung bases: Unremarkable    Liver/gallbladder/biliary: The liver demonstrates no suspicious findings.  Incidental note is made of a small cyst in the right lobe.  It measures 1.9 x 1.5 cm on the inferior aspect of the right lobe of liver..  The gallbladder is normal in size.  There is streaky increased attenuation of the gallbladder wall raising concern for changes of cholecystitis with some increased markings in the adjacent fat.  The common bile duct appears within normal limits.No biliary ductal dilation.    Pancreas: The pancreas is unremarkable in appearance.    Spleen: The spleen is not enlarged.    Adrenals: Unremarkable    Kidneys: The kidneys are equally perfused and demonstrate no solid masses.    Bowel/Mesentery: There is no evidence of bowel obstruction. There is diverticulosis of the colon.  The appendix is normal in appearance.  No  mesenteric stranding or adenopathy.    Retroperitoneum: No adenopathy.The aorta demonstrates a normal caliber.    PELVIS:    Genitourinary/Reproductive organs: Unremarkable    Adenopathy: None    Free Fluid: No free fluid    Osseus Structures/Soft tissues: No suspicious appearing osseus lesions.  Fat filled inguinal hernias are present bilaterally.                               X-Ray Chest PA And Lateral (Final result)  Result time 01/07/19 22:18:20    Final result by SHIRLEY Corcoran Sr., MD (01/07/19 22:18:20)                 Impression:      1. The lungs are clear.  2. There is mild cardiomegaly.  .      Electronically signed by: Quentin Corcoran MD  Date:    01/07/2019  Time:    22:18             Narrative:    EXAMINATION:  XR CHEST PA AND LATERAL    CLINICAL HISTORY:  Chest Pain;    COMPARISON:  None    FINDINGS:  There is mild cardiomegaly.  The lungs are clear. There is no pneumothorax or pleural effusion.                               The EKG was ordered, reviewed, and independently interpreted by the ED provider.  Interpretation time: 2127  Rate: 110 BPM  Rhythm: sinus tachycardia with PAC's  Interpretation: Possible LAE. No STEMI.           The Emergency Provider reviewed the vital signs and test results, which are outlined above.    ED Discussion     10:46 PM: Re-evaluated pt. Pt reports he has high cholesterol and is noncompliant with his medication. I have discussed test results, shared treatment plan, and the need for admission with patient and family at bedside. Pt and family express understanding at this time and agree with all information. All questions answered. Pt and family have no further questions or concerns at this time. Pt is ready for admit.    12:17 AM: Discussed pt's case with Dr. Loza (General Surgery) who recommends admitting pt to  and consulting GI. He reports surgery can see pt in the AM if needed.     12:44 AM: Discussed case with Dr. Hanna (Hospital Medicine). Dr. Hanna agrees  with current care and management of pt and accepts admission.   Admitting Service: Hospital medicine   Admitting Physician: Dr. Hanna  Admit to: Telemetry    ED Medication(s):  Medications   sodium chloride 0.9% bolus 1,000 mL (0 mLs Intravenous Stopped 1/7/19 2358)   morphine injection 4 mg (4 mg Intravenous Given 1/7/19 2155)   ondansetron injection 8 mg (8 mg Intravenous Given 1/7/19 2156)   pantoprazole injection 40 mg (40 mg Intravenous Given 1/7/19 2200)   GI cocktail (mylanta 30 mL, lidocaine 2 % viscous 10 mL, dicyclomine 10 mL) 50 mL (50 mLs Oral Given 1/7/19 2159)   omnipaque 350 iohexol 75 mL (75 mLs Intravenous Given 1/7/19 2319)             Medical Decision Making    Medical Decision Making:   Clinical Tests:   Lab Tests: Ordered and Reviewed  Radiological Study: Ordered and Reviewed  Medical Tests: Ordered and Reviewed           Scribe Attestation:   Scribe #1: I performed the above scribed service and the documentation accurately describes the services I performed. I attest to the accuracy of the note.    Attending:   Physician Attestation Statement for Scribe #1: I, Rock Yarbrough MD, personally performed the services described in this documentation, as scribed by Donna Lozano, in my presence, and it is both accurate and complete.          Clinical Impression       ICD-10-CM ICD-9-CM   1. Acute pancreatitis, unspecified complication status, unspecified pancreatitis type K85.90 577.0   2. Abdominal pain R10.9 789.00   3. Epigastric pain R10.13 789.06   4. Thickening of wall of gallbladder K82.8 575.8       Disposition:   Disposition: Admitted  Condition: Fair

## 2019-01-08 NOTE — PROGRESS NOTES
[facility] Hospital Medicine  Progress Note    Patient Name: Des Dash  MRN: [id number]  Patient Class: IP- Inpatient   Admission Date: 1/7/2019  Length of Stay: 0 days  Attending Physician: Manuel Darby MD  Primary Care Provider: Juan José Batres MD        Subjective:     Principal Problem:Acute pancreatitis    HPI:  58M h/o HA presents with epigastric pain x 1 day.  Onset sudden, burning characteristic.  Associated with N/V x 2.   Also c/o HA.  Denies fever, chills, diarrhea, constipation, hematochezia, melena, dysuria, hematuria, frequency, cough, congestion, and all other sxs at this time.   Denies ETOH use.  Current smoker.  In ER,  Lipase >1000.  CT/US negative for cholecystitis and pancreatitis.  Patient given 1L NS bolus.         Hospital Course:  Pt admitted to Medical Surgical Unit for acute pancreatitis.  CT of abdomen showed findings suspicious for cholecystitis with inflamed appearing gallbladder.  US showed mild wall thickening of the gallbladder with no stones.  Elevated amylase, lipase, and LFTs noted.  Pt treated with IV hydration, bowel rest, analgesia prn, and antiemetics prn.  Lipase and LFTs trending down.  Pt verbalized symptom improvement with N/V and abdominal pain denied.  Pt smokes 1 ppd x 42 years and counseled on smoking cessation with understanding verified.      Interval History: pt states he is feeling better and denies GI symptoms at time of exam.  Lipase and LFTs trending down.  Pt counseled on smoking cessation with understanding verbalized.      Review of Systems   Constitutional: Negative for activity change, appetite change, chills, diaphoresis, fatigue and fever.   HENT: Negative for facial swelling, sore throat, tinnitus and trouble swallowing.    Eyes: Negative for photophobia and visual disturbance.   Respiratory: Negative for apnea, cough, chest tightness, shortness of breath and wheezing.    Cardiovascular: Negative for chest pain,  palpitations and leg swelling.   Gastrointestinal: Positive for abdominal pain (mild). Negative for abdominal distention, constipation, diarrhea, nausea and vomiting.   Endocrine: Negative for polydipsia, polyphagia and polyuria.   Genitourinary: Negative for decreased urine volume, dysuria, flank pain, frequency and hematuria.   Musculoskeletal: Positive for back pain and neck pain. Negative for arthralgias, joint swelling, myalgias and neck stiffness.   Skin: Negative for pallor and rash.   Allergic/Immunologic: Negative for immunocompromised state.   Neurological: Positive for headaches. Negative for dizziness, seizures, syncope, weakness and numbness.   Psychiatric/Behavioral: Negative for confusion, hallucinations and suicidal ideas. The patient is not nervous/anxious.    All other systems reviewed and are negative.    Objective:     Vital Signs (Most Recent):  Temp: 99.9 °F (37.7 °C) (01/08/19 0820)  Pulse: 89 (01/08/19 0820)  Resp: 20 (01/08/19 0820)  BP: 121/66 (01/08/19 0820)  SpO2: 95 % (01/08/19 0820) Vital Signs (24h Range):  Temp:  [98.8 °F (37.1 °C)-99.9 °F (37.7 °C)] 99.9 °F (37.7 °C)  Pulse:  [83-91] 89  Resp:  [18-20] 20  SpO2:  [95 %-96 %] 95 %  BP: (116-122)/(60-73) 121/66     Weight: 109.1 kg (240 lb 10.1 oz)  Body mass index is 32.64 kg/m².  No intake or output data in the 24 hours ending 01/08/19 1555   Physical Exam   Constitutional: He is oriented to person, place, and time. He appears well-developed and well-nourished. No distress.   HENT:   Head: Normocephalic and atraumatic.   Mouth/Throat: Oropharynx is clear and moist.   Eyes: Conjunctivae are normal. Pupils are equal, round, and reactive to light. No scleral icterus.   Neck: Normal range of motion. Neck supple. No JVD present. No thyromegaly present.   Cardiovascular: Normal rate and regular rhythm. Exam reveals no gallop and no friction rub.   No murmur heard.  Pulmonary/Chest: Effort normal and breath sounds normal. No respiratory  distress. He has no wheezes. He has no rales.   Abdominal: Soft. Bowel sounds are normal. He exhibits no distension. There is tenderness. There is no guarding.   Musculoskeletal: Normal range of motion.   Neurological: He is alert and oriented to person, place, and time. No cranial nerve deficit.   Skin: Skin is warm. Capillary refill takes less than 2 seconds. He is not diaphoretic. No erythema.   Psychiatric: He has a normal mood and affect.   Nursing note and vitals reviewed.      Significant Labs:   CBC:   Recent Labs   Lab 01/07/19 2148 01/08/19  0420   WBC 9.45 12.59   HGB 15.2 13.0*   HCT 43.9 39.2*   * 135*     CMP:   Recent Labs   Lab 01/07/19 2148 01/08/19 0420    138   K 3.5 4.4    104   CO2 21* 22*   GLU 91 91   BUN 18 16   CREATININE 1.2 1.2   CALCIUM 9.3 8.7   PROT 7.0 6.4   ALBUMIN 4.0 3.5   BILITOT 2.5* 1.4*   ALKPHOS 111 96   * 130*   * 105*   ANIONGAP 12 12   EGFRNONAA >60 >60     Lipase:   Recent Labs   Lab 01/07/19 2148 01/08/19  0408   LIPASE >1000* 812*       Significant Imaging:   Imaging Results          US Abdomen Limited (Gallbladder) (Final result)  Result time 01/07/19 23:50:53    Final result by Bob Maldonado MD (01/07/19 23:50:53)                 Impression:      There is some mild wall thickening of the gallbladder but no stones are demonstrated.  The Swain sign is negative..      Electronically signed by: Remigio Maldonado MD  Date:    01/07/2019  Time:    23:50             Narrative:    EXAMINATION:  US ABDOMEN LIMITED    CLINICAL HISTORY:  Epigastric pain    TECHNIQUE:  Limited ultrasound of the right upper quadrant of the abdomen (including pancreas, liver, gallbladder, common bile duct, and right kidney) was performed.    COMPARISON:  01/07/2019 CT scan    FINDINGS:  Liver: Normal in size. The liver demonstrates homogenous echotexture. There is a 1.11 x 1.31 x 1.32 cm cyst in the inferior portion right lobe of liver which matches the  findings on ultrasound.    Biliary system: The gallbladder demonstrates no evidence of calculi. The gallbladder wall appears mildly thickened measuring 0.26 cm.  No gallstones are identified.  Swain sign is negative.  The common duct is not dilated, measuring 0.55 cm. No intrahepatic ductal dilatation.    Pancreas: Much of the pancreas is obscured by overlying bowel gas.    Right kidney: Normal in size measuring 10.10 cmwith no hydronephrosis, mass, or calculi.    Vascular: The portions of the aorta, vena cava, and portal vein appear free of acute abnormality.                               CT Abdomen Pelvis With Contrast (Final result)  Result time 01/07/19 23:28:46    Final result by Bob Maldonado MD (01/07/19 23:28:46)                 Impression:      The findings are suspicious for cholecystitis with inflamed appearing gallbladder.  If clinically indicated, ultrasound may prove helpful in the further evaluation of this patient.    All CT scans at (this location) are performed using dose modulation techniques as appropriate to a performed exam including the following:  automated exposure control; adjustment of the mA and /or kV according to patient size (this includes techniques or standardized protocols for targeted exams where dose is matched to indication/reason for exam: i.e. extremities or head); use of iterative reconstruction technique.      Electronically signed by: Remigio Maldonado MD  Date:    01/07/2019  Time:    23:28             Narrative:    EXAMINATION:  CT ABDOMEN PELVIS WITH CONTRAST    CLINICAL HISTORY:  pancreatitis, elevated LFTs;    TECHNIQUE:  Low dose axial images, sagittal and coronal reformations were obtained from the lung bases to the pubic symphysis following the IV administration of 75 mL of Omnipaque 350 .    COMPARISON:  None.    FINDINGS:  ABDOMEN    Lung bases: Unremarkable    Liver/gallbladder/biliary: The liver demonstrates no suspicious findings.  Incidental note is made of  a small cyst in the right lobe.  It measures 1.9 x 1.5 cm on the inferior aspect of the right lobe of liver..  The gallbladder is normal in size.  There is streaky increased attenuation of the gallbladder wall raising concern for changes of cholecystitis with some increased markings in the adjacent fat.  The common bile duct appears within normal limits.No biliary ductal dilation.    Pancreas: The pancreas is unremarkable in appearance.    Spleen: The spleen is not enlarged.    Adrenals: Unremarkable    Kidneys: The kidneys are equally perfused and demonstrate no solid masses.    Bowel/Mesentery: There is no evidence of bowel obstruction. There is diverticulosis of the colon.  The appendix is normal in appearance.  No mesenteric stranding or adenopathy.    Retroperitoneum: No adenopathy.The aorta demonstrates a normal caliber.    PELVIS:    Genitourinary/Reproductive organs: Unremarkable    Adenopathy: None    Free Fluid: No free fluid    Osseus Structures/Soft tissues: No suspicious appearing osseus lesions.  Fat filled inguinal hernias are present bilaterally.                               X-Ray Chest PA And Lateral (Final result)  Result time 01/07/19 22:18:20    Final result by SHIRLEY Corcoran Sr., MD (01/07/19 22:18:20)                 Impression:      1. The lungs are clear.  2. There is mild cardiomegaly.  .      Electronically signed by: Quentin Corcoran MD  Date:    01/07/2019  Time:    22:18             Narrative:    EXAMINATION:  XR CHEST PA AND LATERAL    CLINICAL HISTORY:  Chest Pain;    COMPARISON:  None    FINDINGS:  There is mild cardiomegaly.  The lungs are clear. There is no pneumothorax or pleural effusion.                              Assessment/Plan:      * Acute pancreatitis    - p/w epigastric pain, n/v  - CT/US shows suspected cholecystitis  - lipase 812   - diagnosis based on sx and labs  - aggressive IV hydration with 200cc/hr of NS  - morphine prn pain  - zofran/promethazine prn nausea  -  keep NPO  - GI ppx with protonix  - check lipid panel, UDS, ethanol level to eval for etiology       Tobacco abuse    -Nicotine   -pt counseled on smoking cessation with understanding verbalized        Transaminitis    - likely due to pancreatiis  - hold statin  - check cpk,   -hep panel negative         VTE Risk Mitigation (From admission, onward)        Ordered     enoxaparin injection 40 mg  Daily      01/08/19 0103     IP VTE HIGH RISK PATIENT  Once      01/08/19 0103              Lidia Singleton NP  Department of Hospital Medicine   [facility]

## 2019-01-08 NOTE — ASSESSMENT & PLAN NOTE
- p/w epigastric pain, n/v  - CT/US shows suspected cholecystitis  - lipase 812   - diagnosis based on sx and labs  - aggressive IV hydration with 200cc/hr of NS  - morphine prn pain  - zofran/promethazine prn nausea  - keep NPO  - GI ppx with protonix  - check lipid panel, UDS, ethanol level to eval for etiology

## 2019-01-08 NOTE — HOSPITAL COURSE
Pt admitted to Medical Surgical Unit for acute pancreatitis.  CT of abdomen showed findings suspicious for cholecystitis with inflamed appearing gallbladder.  US showed mild wall thickening of the gallbladder with no stones.  Elevated amylase, lipase, and LFTs noted.  Pt treated with IV hydration, bowel rest, analgesia prn, and antiemetics prn.  Lipase and LFTs trending down.  Pt verbalized symptom improvement with N/V and abdominal pain denied.  Pt smokes 1 ppd x 42 years and counseled on smoking cessation with understanding verified.  Diet advanced and pt unable to tolerate.  NPO status resumed with abdominal complaints continued.  Case discussed with GI with no further recommendations.  General Surgery consulted for further evaluation. Amylase and LFTs normalized with Lipase trending down.  Pt noted to be febrile with blood cultures + for gram negative rods.  IV antibiotics initiated and ID consulted.  HIDA scan obtained with results showing cystic and common surendra ducts are patent with decreased gallbladder ejection fraction of 14% at 40 min with findings suggesting chronic cholecystitis versus biliary dyskinesia.  Surgical procedure planned for today.  Laparoscopic cholecystectomy by Dr. Will.  Upon removal and inspection, the gallbladder was grossly inflamed and filled with stones.  Uneventful post-op recovery.  Discharge plan to return home and complete a course of Ciprofloxacin for two weeks to cover for bacteremia.  Follow up blood cultures negative.

## 2019-01-08 NOTE — SUBJECTIVE & OBJECTIVE
History reviewed. No pertinent past medical history.    History reviewed. No pertinent surgical history.    Review of patient's allergies indicates:  No Known Allergies    No current facility-administered medications on file prior to encounter.      Current Outpatient Medications on File Prior to Encounter   Medication Sig    ALPRAZolam (XANAX) 1 MG tablet Take 1 mg by mouth.    aspirin (ECOTRIN) 81 MG EC tablet Take 81 mg by mouth once daily.    dextroamphetamine-amphetamine (ADDERALL) 10 mg Tab Take 10 mg by mouth.    dicyclomine (BENTYL) 10 MG capsule Take 10 mg by mouth.    esomeprazole (NEXIUM) 40 MG capsule Take 40 mg by mouth.    fluticasone (FLONASE) 50 mcg/actuation nasal spray 2 sprays by Nasal route.    niacin (NIASPAN) 750 MG CR tablet Take 750 mg by mouth.    pravastatin (PRAVACHOL) 80 MG tablet Take 80 mg by mouth.    sotalol (BETAPACE) 80 MG tablet Take 80 mg by mouth.    traMADol (ULTRAM) 50 mg tablet Take 50 mg by mouth.     Family History     None        Tobacco Use    Smoking status: Current Every Day Smoker    Smokeless tobacco: Never Used   Substance and Sexual Activity    Alcohol use: No     Frequency: Never    Drug use: No    Sexual activity: Not Currently     Review of Systems   Constitutional: Negative for activity change, appetite change, chills, diaphoresis, fatigue and fever.   HENT: Negative for facial swelling, sore throat, tinnitus and trouble swallowing.    Eyes: Negative for photophobia and visual disturbance.   Respiratory: Negative for apnea, cough, chest tightness, shortness of breath and wheezing.    Cardiovascular: Negative for chest pain, palpitations and leg swelling.   Gastrointestinal: Positive for abdominal pain, nausea and vomiting. Negative for abdominal distention, constipation and diarrhea.   Endocrine: Negative for polydipsia, polyphagia and polyuria.   Genitourinary: Negative for decreased urine volume, dysuria, flank pain, frequency and hematuria.    Musculoskeletal: Negative for arthralgias, back pain, joint swelling, myalgias and neck stiffness.   Skin: Negative for pallor and rash.   Allergic/Immunologic: Negative for immunocompromised state.   Neurological: Negative for dizziness, seizures, syncope, weakness, numbness and headaches.   Psychiatric/Behavioral: Negative for confusion, hallucinations and suicidal ideas. The patient is not nervous/anxious.    All other systems reviewed and are negative.    Objective:     Vital Signs (Most Recent):  Temp: 98.8 °F (37.1 °C) (01/07/19 1945)  Pulse: 85 (01/07/19 1945)  Resp: 20 (01/07/19 1945)  BP: 116/73 (01/07/19 1945)  SpO2: 95 % (01/07/19 1945) Vital Signs (24h Range):  Temp:  [98.8 °F (37.1 °C)] 98.8 °F (37.1 °C)  Pulse:  [85] 85  Resp:  [20] 20  SpO2:  [95 %] 95 %  BP: (116)/(73) 116/73     Weight: 109.1 kg (240 lb 10.1 oz)  Body mass index is 32.64 kg/m².    Physical Exam   Constitutional: He is oriented to person, place, and time. He appears well-developed and well-nourished. No distress.   HENT:   Head: Normocephalic and atraumatic.   Mouth/Throat: Oropharynx is clear and moist.   Eyes: Conjunctivae are normal. Pupils are equal, round, and reactive to light. No scleral icterus.   Neck: Normal range of motion. Neck supple. No JVD present. No thyromegaly present.   Cardiovascular: Normal rate and regular rhythm. Exam reveals no gallop and no friction rub.   No murmur heard.  Pulmonary/Chest: Effort normal and breath sounds normal. No respiratory distress. He has no wheezes. He has no rales.   Abdominal: Soft. Bowel sounds are normal. He exhibits no distension. There is tenderness. There is no guarding.   Musculoskeletal: Normal range of motion.   Neurological: He is alert and oriented to person, place, and time. No cranial nerve deficit.   Skin: Skin is warm. Capillary refill takes less than 2 seconds. He is not diaphoretic. No erythema.   Psychiatric: He has a normal mood and affect.   Nursing note and  vitals reviewed.        CRANIAL NERVES     CN III, IV, VI   Pupils are equal, round, and reactive to light.       Significant Labs:   CBC:   Recent Labs   Lab 01/07/19  2148   WBC 9.45   HGB 15.2   HCT 43.9   *     CMP:   Recent Labs   Lab 01/07/19  2148      K 3.5      CO2 21*   GLU 91   BUN 18   CREATININE 1.2   CALCIUM 9.3   PROT 7.0   ALBUMIN 4.0   BILITOT 2.5*   ALKPHOS 111   *   *   ANIONGAP 12   EGFRNONAA >60     Urine Studies: No results for input(s): COLORU, APPEARANCEUA, PHUR, SPECGRAV, PROTEINUA, GLUCUA, KETONESU, BILIRUBINUA, OCCULTUA, NITRITE, UROBILINOGEN, LEUKOCYTESUR, RBCUA, WBCUA, BACTERIA, SQUAMEPITHEL, HYALINECASTS in the last 48 hours.    Invalid input(s): WRIGHTSUR  All pertinent labs within the past 24 hours have been reviewed.    Significant Imaging: I have reviewed all pertinent imaging results/findings within the past 24 hours.   Imaging Results          US Abdomen Limited (Gallbladder) (Final result)  Result time 01/07/19 23:50:53    Final result by Bob Maldonado MD (01/07/19 23:50:53)                 Impression:      There is some mild wall thickening of the gallbladder but no stones are demonstrated.  The Swain sign is negative..      Electronically signed by: Remigio Maldonado MD  Date:    01/07/2019  Time:    23:50             Narrative:    EXAMINATION:  US ABDOMEN LIMITED    CLINICAL HISTORY:  Epigastric pain    TECHNIQUE:  Limited ultrasound of the right upper quadrant of the abdomen (including pancreas, liver, gallbladder, common bile duct, and right kidney) was performed.    COMPARISON:  01/07/2019 CT scan    FINDINGS:  Liver: Normal in size. The liver demonstrates homogenous echotexture. There is a 1.11 x 1.31 x 1.32 cm cyst in the inferior portion right lobe of liver which matches the findings on ultrasound.    Biliary system: The gallbladder demonstrates no evidence of calculi. The gallbladder wall appears mildly thickened measuring 0.26 cm.   No gallstones are identified.  Swain sign is negative.  The common duct is not dilated, measuring 0.55 cm. No intrahepatic ductal dilatation.    Pancreas: Much of the pancreas is obscured by overlying bowel gas.    Right kidney: Normal in size measuring 10.10 cmwith no hydronephrosis, mass, or calculi.    Vascular: The portions of the aorta, vena cava, and portal vein appear free of acute abnormality.                               CT Abdomen Pelvis With Contrast (Final result)  Result time 01/07/19 23:28:46    Final result by Bob Maldonado MD (01/07/19 23:28:46)                 Impression:      The findings are suspicious for cholecystitis with inflamed appearing gallbladder.  If clinically indicated, ultrasound may prove helpful in the further evaluation of this patient.    All CT scans at (this location) are performed using dose modulation techniques as appropriate to a performed exam including the following:  automated exposure control; adjustment of the mA and /or kV according to patient size (this includes techniques or standardized protocols for targeted exams where dose is matched to indication/reason for exam: i.e. extremities or head); use of iterative reconstruction technique.      Electronically signed by: Remigio Maldonado MD  Date:    01/07/2019  Time:    23:28             Narrative:    EXAMINATION:  CT ABDOMEN PELVIS WITH CONTRAST    CLINICAL HISTORY:  pancreatitis, elevated LFTs;    TECHNIQUE:  Low dose axial images, sagittal and coronal reformations were obtained from the lung bases to the pubic symphysis following the IV administration of 75 mL of Omnipaque 350 .    COMPARISON:  None.    FINDINGS:  ABDOMEN    Lung bases: Unremarkable    Liver/gallbladder/biliary: The liver demonstrates no suspicious findings.  Incidental note is made of a small cyst in the right lobe.  It measures 1.9 x 1.5 cm on the inferior aspect of the right lobe of liver..  The gallbladder is normal in size.  There is  streaky increased attenuation of the gallbladder wall raising concern for changes of cholecystitis with some increased markings in the adjacent fat.  The common bile duct appears within normal limits.No biliary ductal dilation.    Pancreas: The pancreas is unremarkable in appearance.    Spleen: The spleen is not enlarged.    Adrenals: Unremarkable    Kidneys: The kidneys are equally perfused and demonstrate no solid masses.    Bowel/Mesentery: There is no evidence of bowel obstruction. There is diverticulosis of the colon.  The appendix is normal in appearance.  No mesenteric stranding or adenopathy.    Retroperitoneum: No adenopathy.The aorta demonstrates a normal caliber.    PELVIS:    Genitourinary/Reproductive organs: Unremarkable    Adenopathy: None    Free Fluid: No free fluid    Osseus Structures/Soft tissues: No suspicious appearing osseus lesions.  Fat filled inguinal hernias are present bilaterally.                               X-Ray Chest PA And Lateral (Final result)  Result time 01/07/19 22:18:20    Final result by SHIRLEY Corcoran Sr., MD (01/07/19 22:18:20)                 Impression:      1. The lungs are clear.  2. There is mild cardiomegaly.  .      Electronically signed by: Quentin Corcoran MD  Date:    01/07/2019  Time:    22:18             Narrative:    EXAMINATION:  XR CHEST PA AND LATERAL    CLINICAL HISTORY:  Chest Pain;    COMPARISON:  None    FINDINGS:  There is mild cardiomegaly.  The lungs are clear. There is no pneumothorax or pleural effusion.

## 2019-01-08 NOTE — ASSESSMENT & PLAN NOTE
- p/w epigastric pain, n/v  - CT/US negative for cholecystitis and pancreatitis  - lipase >1000, amylase 1182  - diagnosis based on sx and labs    - aggressive IV hydration with 200cc/hr of NS  - morphine prn pain  - zofran/promethazine prn nausea  - keep NPO  - GI ppx with protonix  - check lipid panel, UDS, ethanol level to eval for etiology

## 2019-01-08 NOTE — ED NOTES
Pt lying in bed in NAD. Bed is low, locked, and call light in reach. Side rails up x 2. Denies needs at this time.

## 2019-01-09 PROBLEM — G47.33 OSA (OBSTRUCTIVE SLEEP APNEA): Status: ACTIVE | Noted: 2019-01-09

## 2019-01-09 LAB
ALBUMIN SERPL BCP-MCNC: 3.2 G/DL
ALP SERPL-CCNC: 96 U/L
ALT SERPL W/O P-5'-P-CCNC: 67 U/L
AMYLASE SERPL-CCNC: 122 U/L
ANION GAP SERPL CALC-SCNC: 9 MMOL/L
AST SERPL-CCNC: 56 U/L
BACTERIA #/AREA URNS HPF: NORMAL /HPF
BASOPHILS # BLD AUTO: 0.01 K/UL
BASOPHILS NFR BLD: 0.1 %
BILIRUB SERPL-MCNC: 1.4 MG/DL
BILIRUB UR QL STRIP: NEGATIVE
BUN SERPL-MCNC: 11 MG/DL
CALCIUM SERPL-MCNC: 8.9 MG/DL
CHLORIDE SERPL-SCNC: 106 MMOL/L
CLARITY UR: CLEAR
CO2 SERPL-SCNC: 23 MMOL/L
COLOR UR: YELLOW
CREAT SERPL-MCNC: 0.9 MG/DL
DIFFERENTIAL METHOD: ABNORMAL
EOSINOPHIL # BLD AUTO: 0.3 K/UL
EOSINOPHIL NFR BLD: 3.9 %
ERYTHROCYTE [DISTWIDTH] IN BLOOD BY AUTOMATED COUNT: 14.4 %
EST. GFR  (AFRICAN AMERICAN): >60 ML/MIN/1.73 M^2
EST. GFR  (NON AFRICAN AMERICAN): >60 ML/MIN/1.73 M^2
GLUCOSE SERPL-MCNC: 86 MG/DL
GLUCOSE UR QL STRIP: NEGATIVE
HCT VFR BLD AUTO: 40.5 %
HGB BLD-MCNC: 13 G/DL
HGB UR QL STRIP: ABNORMAL
KETONES UR QL STRIP: ABNORMAL
LEUKOCYTE ESTERASE UR QL STRIP: NEGATIVE
LIPASE SERPL-CCNC: 296 U/L
LYMPHOCYTES # BLD AUTO: 0.7 K/UL
LYMPHOCYTES NFR BLD: 9.2 %
MAGNESIUM SERPL-MCNC: 2.2 MG/DL
MCH RBC QN AUTO: 29.7 PG
MCHC RBC AUTO-ENTMCNC: 32.1 G/DL
MCV RBC AUTO: 93 FL
MICROSCOPIC COMMENT: NORMAL
MONOCYTES # BLD AUTO: 0.7 K/UL
MONOCYTES NFR BLD: 9.6 %
NEUTROPHILS # BLD AUTO: 5.5 K/UL
NEUTROPHILS NFR BLD: 77.2 %
NITRITE UR QL STRIP: NEGATIVE
PH UR STRIP: 6 [PH] (ref 5–8)
PHOSPHATE SERPL-MCNC: 1.9 MG/DL
PLATELET # BLD AUTO: 115 K/UL
PMV BLD AUTO: 9.2 FL
POTASSIUM SERPL-SCNC: 4.2 MMOL/L
PROT SERPL-MCNC: 6.4 G/DL
PROT UR QL STRIP: NEGATIVE
RBC # BLD AUTO: 4.37 M/UL
RBC #/AREA URNS HPF: 2 /HPF (ref 0–4)
SODIUM SERPL-SCNC: 138 MMOL/L
SP GR UR STRIP: 1.02 (ref 1–1.03)
SQUAMOUS #/AREA URNS HPF: 0 /HPF
URN SPEC COLLECT METH UR: ABNORMAL
UROBILINOGEN UR STRIP-ACNC: NEGATIVE EU/DL
WBC # BLD AUTO: 7.18 K/UL
WBC #/AREA URNS HPF: 0 /HPF (ref 0–5)

## 2019-01-09 PROCEDURE — 25000003 PHARM REV CODE 250: Performed by: INTERNAL MEDICINE

## 2019-01-09 PROCEDURE — C9113 INJ PANTOPRAZOLE SODIUM, VIA: HCPCS | Performed by: HOSPITALIST

## 2019-01-09 PROCEDURE — 96372 THER/PROPH/DIAG INJ SC/IM: CPT

## 2019-01-09 PROCEDURE — 87040 BLOOD CULTURE FOR BACTERIA: CPT

## 2019-01-09 PROCEDURE — 25000003 PHARM REV CODE 250: Performed by: NURSE PRACTITIONER

## 2019-01-09 PROCEDURE — 63600175 PHARM REV CODE 636 W HCPCS: Performed by: NURSE PRACTITIONER

## 2019-01-09 PROCEDURE — 87186 SC STD MICRODIL/AGAR DIL: CPT

## 2019-01-09 PROCEDURE — 82150 ASSAY OF AMYLASE: CPT

## 2019-01-09 PROCEDURE — 84100 ASSAY OF PHOSPHORUS: CPT

## 2019-01-09 PROCEDURE — 83735 ASSAY OF MAGNESIUM: CPT

## 2019-01-09 PROCEDURE — 27000190 HC CPAP FULL FACE MASK W/VALVE

## 2019-01-09 PROCEDURE — 81000 URINALYSIS NONAUTO W/SCOPE: CPT

## 2019-01-09 PROCEDURE — 63600175 PHARM REV CODE 636 W HCPCS: Performed by: HOSPITALIST

## 2019-01-09 PROCEDURE — 11000001 HC ACUTE MED/SURG PRIVATE ROOM

## 2019-01-09 PROCEDURE — 25000003 PHARM REV CODE 250: Performed by: HOSPITALIST

## 2019-01-09 PROCEDURE — S4991 NICOTINE PATCH NONLEGEND: HCPCS | Performed by: NURSE PRACTITIONER

## 2019-01-09 PROCEDURE — 94660 CPAP INITIATION&MGMT: CPT

## 2019-01-09 PROCEDURE — 83690 ASSAY OF LIPASE: CPT

## 2019-01-09 PROCEDURE — 99900035 HC TECH TIME PER 15 MIN (STAT)

## 2019-01-09 PROCEDURE — 36415 COLL VENOUS BLD VENIPUNCTURE: CPT

## 2019-01-09 PROCEDURE — 87077 CULTURE AEROBIC IDENTIFY: CPT | Mod: 59

## 2019-01-09 PROCEDURE — 80053 COMPREHEN METABOLIC PANEL: CPT

## 2019-01-09 PROCEDURE — 85025 COMPLETE CBC W/AUTO DIFF WBC: CPT

## 2019-01-09 RX ORDER — BUTALBITAL, ACETAMINOPHEN AND CAFFEINE 50; 325; 40 MG/1; MG/1; MG/1
1 TABLET ORAL EVERY 4 HOURS PRN
Status: DISCONTINUED | OUTPATIENT
Start: 2019-01-09 | End: 2019-01-12 | Stop reason: HOSPADM

## 2019-01-09 RX ORDER — SODIUM CHLORIDE 9 MG/ML
INJECTION, SOLUTION INTRAVENOUS CONTINUOUS
Status: DISCONTINUED | OUTPATIENT
Start: 2019-01-09 | End: 2019-01-12 | Stop reason: HOSPADM

## 2019-01-09 RX ORDER — OXYMETAZOLINE HCL 0.05 %
2 SPRAY, NON-AEROSOL (ML) NASAL 2 TIMES DAILY
Status: DISCONTINUED | OUTPATIENT
Start: 2019-01-09 | End: 2019-01-12 | Stop reason: HOSPADM

## 2019-01-09 RX ORDER — ACETAMINOPHEN 325 MG/1
650 TABLET ORAL EVERY 6 HOURS PRN
Status: DISCONTINUED | OUTPATIENT
Start: 2019-01-09 | End: 2019-01-11

## 2019-01-09 RX ADMIN — BUTALBITAL, ACETAMINOPHEN, AND CAFFEINE 1 TABLET: 50; 325; 40 TABLET ORAL at 09:01

## 2019-01-09 RX ADMIN — PANTOPRAZOLE SODIUM 40 MG: 40 INJECTION, POWDER, LYOPHILIZED, FOR SOLUTION INTRAVENOUS at 08:01

## 2019-01-09 RX ADMIN — ACETAMINOPHEN 650 MG: 325 TABLET ORAL at 12:01

## 2019-01-09 RX ADMIN — ACETAMINOPHEN 650 MG: 325 TABLET ORAL at 06:01

## 2019-01-09 RX ADMIN — OXYMETAZOLINE HYDROCHLORIDE 2 SPRAY: 5 SPRAY NASAL at 10:01

## 2019-01-09 RX ADMIN — Medication 1 PATCH: at 08:01

## 2019-01-09 RX ADMIN — MORPHINE SULFATE 4 MG: 10 INJECTION, SOLUTION INTRAMUSCULAR; INTRAVENOUS at 06:01

## 2019-01-09 RX ADMIN — ENOXAPARIN SODIUM 40 MG: 100 INJECTION SUBCUTANEOUS at 04:01

## 2019-01-09 RX ADMIN — MORPHINE SULFATE 4 MG: 10 INJECTION, SOLUTION INTRAMUSCULAR; INTRAVENOUS at 12:01

## 2019-01-09 RX ADMIN — MORPHINE SULFATE 4 MG: 10 INJECTION, SOLUTION INTRAMUSCULAR; INTRAVENOUS at 04:01

## 2019-01-09 RX ADMIN — SODIUM CHLORIDE: 0.9 INJECTION, SOLUTION INTRAVENOUS at 08:01

## 2019-01-09 RX ADMIN — LORAZEPAM 0.5 MG: 2 INJECTION INTRAMUSCULAR; INTRAVENOUS at 08:01

## 2019-01-09 RX ADMIN — ACETAMINOPHEN 650 MG: 325 TABLET ORAL at 04:01

## 2019-01-09 RX ADMIN — SODIUM CHLORIDE: 0.9 INJECTION, SOLUTION INTRAVENOUS at 04:01

## 2019-01-09 NOTE — PROGRESS NOTES
[facility] Hospital Medicine  Progress Note    Patient Name: Des Dash  MRN: [id number]  Patient Class: IP- Inpatient   Admission Date: 1/7/2019  Length of Stay: 1 days  Attending Physician: Manuel Darby MD  Primary Care Provider: Juan José Batres MD        Subjective:     Principal Problem:Acute pancreatitis    HPI:  58M h/o HA presents with epigastric pain x 1 day.  Onset sudden, burning characteristic.  Associated with N/V x 2.   Also c/o HA.  Denies fever, chills, diarrhea, constipation, hematochezia, melena, dysuria, hematuria, frequency, cough, congestion, and all other sxs at this time.   Denies ETOH use.  Current smoker.  In ER,  Lipase >1000.  CT/US negative for cholecystitis and pancreatitis.  Patient given 1L NS bolus.         Hospital Course:  Pt admitted to Medical Surgical Unit for acute pancreatitis.  CT of abdomen showed findings suspicious for cholecystitis with inflamed appearing gallbladder.  US showed mild wall thickening of the gallbladder with no stones.  Elevated amylase, lipase, and LFTs noted.  Pt treated with IV hydration, bowel rest, analgesia prn, and antiemetics prn.  Lipase and LFTs trending down.  Pt verbalized symptom improvement with N/V and abdominal pain denied.  Pt smokes 1 ppd x 42 years and counseled on smoking cessation with understanding verified.  Diet advanced and pt unable to tolerate.  NPO status continued.      Interval History: pt reports increased abdominal pain once diet advanced to clear liquids.  Lipase decreased and LFTs trending down.  VSS.  Will monitor.     Review of Systems   Constitutional: Negative for activity change, appetite change, chills, diaphoresis, fatigue and fever.   HENT: Negative for facial swelling, sore throat, tinnitus and trouble swallowing.    Eyes: Negative for photophobia and visual disturbance.   Respiratory: Negative for apnea, cough, chest tightness, shortness of breath and wheezing.    Cardiovascular:  Negative for chest pain, palpitations and leg swelling.   Gastrointestinal: Positive for abdominal pain and nausea. Negative for abdominal distention, constipation, diarrhea and vomiting.   Endocrine: Negative for polydipsia, polyphagia and polyuria.   Genitourinary: Negative for decreased urine volume, dysuria, flank pain, frequency and hematuria.   Musculoskeletal: Positive for back pain and neck pain. Negative for arthralgias, joint swelling, myalgias and neck stiffness.   Skin: Negative for pallor and rash.   Allergic/Immunologic: Negative for immunocompromised state.   Neurological: Positive for headaches. Negative for dizziness, seizures, syncope, weakness and numbness.   Psychiatric/Behavioral: Negative for confusion, hallucinations and suicidal ideas. The patient is not nervous/anxious.    All other systems reviewed and are negative.    Objective:     Vital Signs (Most Recent):  Temp: 98.7 °F (37.1 °C) (01/09/19 1224)  Pulse: 79 (01/09/19 1224)  Resp: 20 (01/09/19 1224)  BP: (!) 145/78 (01/09/19 1224)  SpO2: 99 % (01/09/19 1224) Vital Signs (24h Range):  Temp:  [98.6 °F (37 °C)-99.8 °F (37.7 °C)] 98.7 °F (37.1 °C)  Pulse:  [79-89] 79  Resp:  [16-20] 20  SpO2:  [95 %-100 %] 99 %  BP: (119-145)/(70-80) 145/78     Weight: 109.1 kg (240 lb 10.1 oz)  Body mass index is 32.64 kg/m².    Intake/Output Summary (Last 24 hours) at 1/9/2019 1703  Last data filed at 1/9/2019 1507  Gross per 24 hour   Intake 2226.67 ml   Output 1000 ml   Net 1226.67 ml      Physical Exam   Constitutional: He is oriented to person, place, and time. He appears well-developed and well-nourished. No distress.   HENT:   Head: Normocephalic and atraumatic.   Mouth/Throat: Oropharynx is clear and moist.   Eyes: Conjunctivae are normal. Pupils are equal, round, and reactive to light. No scleral icterus.   Neck: Normal range of motion. Neck supple. No JVD present. No thyromegaly present.   Cardiovascular: Normal rate and regular rhythm. Exam  reveals no gallop and no friction rub.   No murmur heard.  Pulmonary/Chest: Effort normal and breath sounds normal. No respiratory distress. He has no wheezes. He has no rales.   Abdominal: Soft. Bowel sounds are normal. He exhibits no distension. There is tenderness (mild ). There is no guarding.   Musculoskeletal: Normal range of motion.   Neurological: He is alert and oriented to person, place, and time. No cranial nerve deficit.   Skin: Skin is warm. Capillary refill takes less than 2 seconds. He is not diaphoretic. No erythema.   Psychiatric: He has a normal mood and affect.   Nursing note and vitals reviewed.      Significant Labs:   CBC:   Recent Labs   Lab 01/07/19 2148 01/08/19 0420 01/09/19  0545   WBC 9.45 12.59 7.18   HGB 15.2 13.0* 13.0*   HCT 43.9 39.2* 40.5   * 135* 115*     CMP:   Recent Labs   Lab 01/07/19 2148 01/08/19 0420 01/09/19  0545    138 138   K 3.5 4.4 4.2    104 106   CO2 21* 22* 23   GLU 91 91 86   BUN 18 16 11   CREATININE 1.2 1.2 0.9   CALCIUM 9.3 8.7 8.9   PROT 7.0 6.4 6.4   ALBUMIN 4.0 3.5 3.2*   BILITOT 2.5* 1.4* 1.4*   ALKPHOS 111 96 96   * 130* 56*   * 105* 67*   ANIONGAP 12 12 9   EGFRNONAA >60 >60 >60       Significant Imaging:   Imaging Results          US Abdomen Limited (Gallbladder) (Final result)  Result time 01/07/19 23:50:53    Final result by Bob Maldonado MD (01/07/19 23:50:53)                 Impression:      There is some mild wall thickening of the gallbladder but no stones are demonstrated.  The Swain sign is negative..      Electronically signed by: Remigio Maldonado MD  Date:    01/07/2019  Time:    23:50             Narrative:    EXAMINATION:  US ABDOMEN LIMITED    CLINICAL HISTORY:  Epigastric pain    TECHNIQUE:  Limited ultrasound of the right upper quadrant of the abdomen (including pancreas, liver, gallbladder, common bile duct, and right kidney) was performed.    COMPARISON:  01/07/2019 CT scan    FINDINGS:  Liver:  Normal in size. The liver demonstrates homogenous echotexture. There is a 1.11 x 1.31 x 1.32 cm cyst in the inferior portion right lobe of liver which matches the findings on ultrasound.    Biliary system: The gallbladder demonstrates no evidence of calculi. The gallbladder wall appears mildly thickened measuring 0.26 cm.  No gallstones are identified.  Swain sign is negative.  The common duct is not dilated, measuring 0.55 cm. No intrahepatic ductal dilatation.    Pancreas: Much of the pancreas is obscured by overlying bowel gas.    Right kidney: Normal in size measuring 10.10 cmwith no hydronephrosis, mass, or calculi.    Vascular: The portions of the aorta, vena cava, and portal vein appear free of acute abnormality.                               CT Abdomen Pelvis With Contrast (Final result)  Result time 01/07/19 23:28:46    Final result by Bob Maldonado MD (01/07/19 23:28:46)                 Impression:      The findings are suspicious for cholecystitis with inflamed appearing gallbladder.  If clinically indicated, ultrasound may prove helpful in the further evaluation of this patient.    All CT scans at (this location) are performed using dose modulation techniques as appropriate to a performed exam including the following:  automated exposure control; adjustment of the mA and /or kV according to patient size (this includes techniques or standardized protocols for targeted exams where dose is matched to indication/reason for exam: i.e. extremities or head); use of iterative reconstruction technique.      Electronically signed by: Remigio Maldonado MD  Date:    01/07/2019  Time:    23:28             Narrative:    EXAMINATION:  CT ABDOMEN PELVIS WITH CONTRAST    CLINICAL HISTORY:  pancreatitis, elevated LFTs;    TECHNIQUE:  Low dose axial images, sagittal and coronal reformations were obtained from the lung bases to the pubic symphysis following the IV administration of 75 mL of Omnipaque 350  .    COMPARISON:  None.    FINDINGS:  ABDOMEN    Lung bases: Unremarkable    Liver/gallbladder/biliary: The liver demonstrates no suspicious findings.  Incidental note is made of a small cyst in the right lobe.  It measures 1.9 x 1.5 cm on the inferior aspect of the right lobe of liver..  The gallbladder is normal in size.  There is streaky increased attenuation of the gallbladder wall raising concern for changes of cholecystitis with some increased markings in the adjacent fat.  The common bile duct appears within normal limits.No biliary ductal dilation.    Pancreas: The pancreas is unremarkable in appearance.    Spleen: The spleen is not enlarged.    Adrenals: Unremarkable    Kidneys: The kidneys are equally perfused and demonstrate no solid masses.    Bowel/Mesentery: There is no evidence of bowel obstruction. There is diverticulosis of the colon.  The appendix is normal in appearance.  No mesenteric stranding or adenopathy.    Retroperitoneum: No adenopathy.The aorta demonstrates a normal caliber.    PELVIS:    Genitourinary/Reproductive organs: Unremarkable    Adenopathy: None    Free Fluid: No free fluid    Osseus Structures/Soft tissues: No suspicious appearing osseus lesions.  Fat filled inguinal hernias are present bilaterally.                               X-Ray Chest PA And Lateral (Final result)  Result time 01/07/19 22:18:20    Final result by SHIRLEY Corcoran Sr., MD (01/07/19 22:18:20)                 Impression:      1. The lungs are clear.  2. There is mild cardiomegaly.  .      Electronically signed by: Quentin Corcoran MD  Date:    01/07/2019  Time:    22:18             Narrative:    EXAMINATION:  XR CHEST PA AND LATERAL    CLINICAL HISTORY:  Chest Pain;    COMPARISON:  None    FINDINGS:  There is mild cardiomegaly.  The lungs are clear. There is no pneumothorax or pleural effusion.                              Assessment/Plan:      * Acute pancreatitis    - p/w epigastric pain, n/v  - CT/US shows  suspected cholecystitis  - lipase 812>>296  - diagnosis based on sx and labs  - aggressive IV hydration with 200cc/hr of NS  - morphine prn pain  - zofran/promethazine prn nausea  - diet advanced to clear liquids- pt did not tolerate well   - GI ppx with protonix  - check lipid panel, UDS, ethanol level to eval for etiology       SARAH (obstructive sleep apnea)    CPAP nightly        Tobacco abuse    -Nicotine patch  -pt counseled on smoking cessation with understanding verbalized        Transaminitis    - likely due to pancreatiis  - downward trend noted  - hold statin  - check cpk,   -hep panel negative         VTE Risk Mitigation (From admission, onward)        Ordered     enoxaparin injection 40 mg  Daily      01/08/19 0103     IP VTE HIGH RISK PATIENT  Once      01/08/19 0103              Lidia Singleton NP  Department of Hospital Medicine   [facility]

## 2019-01-09 NOTE — ASSESSMENT & PLAN NOTE
- likely due to pancreatiis  - downward trend noted  - hold statin  - check cpk,   -hep panel negative

## 2019-01-09 NOTE — PLAN OF CARE
Problem: Adult Inpatient Plan of Care  Goal: Plan of Care Review  Outcome: Ongoing (interventions implemented as appropriate)  No acute events overnight. Remains NPO. IVF infusing. Pain is controlled. VSS. Chart reviewed. Will monitor.

## 2019-01-09 NOTE — ASSESSMENT & PLAN NOTE
- p/w epigastric pain, n/v  - CT/US shows suspected cholecystitis  - lipase 812>>296  - diagnosis based on sx and labs  - aggressive IV hydration with 200cc/hr of NS  - morphine prn pain  - zofran/promethazine prn nausea  - diet advanced to clear liquids- pt did not tolerate well   - GI ppx with protonix  - check lipid panel, UDS, ethanol level to eval for etiology

## 2019-01-09 NOTE — PLAN OF CARE
Sw met with patient and spouse at bedside to complete assessment. Pt reports he is independent. Pt reports he uses a CPAP machine at home. Patient denies any post hospital needs or services at this time. Transitional Care Folder, Discharge Planning Begins on Admission pamphlet, [facility] Bedside Delivery pamphlet, Advance Directive information given to patient along with the contact information given. Sw instructed patient or family to call with any questions or concerns.    D/C Plan: Home  D/C Trans: Spouse    Pt's pcp is Juan José Batres MD. Pt uses   [employer] [id number]  [employer] [address] AT [facility] [address]  [address]  [facility] [address]  Phone: [phone] Fax: [phone]       01/09/19 1046   Discharge Assessment   Assessment Type Discharge Planning Assessment   Confirmed/corrected address and phone number on facesheet? Yes   Assessment information obtained from? Caregiver;Patient   Communicated expected length of stay with patient/caregiver yes   Prior to hospitilization cognitive status: Alert/Oriented   Prior to hospitalization functional status: Assistive Equipment;Independent   Current cognitive status: Alert/Oriented   Current Functional Status: Independent;Assistive Equipment   Facility Arrived From: Home   Lives With spouse   Able to Return to Prior Arrangements yes   Is patient able to care for self after discharge? Yes   Who are your caregiver(s) and their phone number(s)? Melinda Dash, spouse, [phone]   Patient's perception of discharge disposition home or selfcare   Readmission Within the Last 30 Days no previous admission in last 30 days   Patient currently being followed by outpatient case management? No   Patient currently receives any other outside agency services? No   Equipment Currently Used at Home CPAP   Do you have any problems affording any of your prescribed medications? TBD   Is the patient  taking medications as prescribed? yes   Does the patient have transportation home? Yes   Transportation Anticipated family or friend will provide   Does the patient receive services at the [facility]? No   Discharge Plan A Home with family   Discharge Plan B Home with family   DME Needed Upon Discharge  none   Patient/Family in Agreement with Plan yes

## 2019-01-09 NOTE — PLAN OF CARE
Problem: Adult Inpatient Plan of Care  Goal: Plan of Care Review  Outcome: Ongoing (interventions implemented as appropriate)  Plan of care reviewed with patient; verbalized understanding. Fall precautions maintained, patient remains free from injury. Pain being managed appropriately. Medications being administered as ordered. Patient not tolerating clear liquids, placed back as NPO. Vital signs stable with no signs of distress noted. Bed low and locked with call light in reach. Will continue monitor.

## 2019-01-09 NOTE — SUBJECTIVE & OBJECTIVE
Interval History: pt reports increased abdominal pain once diet advanced to clear liquids.  Lipase decreased and LFTs trending down.  VSS.  Will monitor.     Review of Systems   Constitutional: Negative for activity change, appetite change, chills, diaphoresis, fatigue and fever.   HENT: Negative for facial swelling, sore throat, tinnitus and trouble swallowing.    Eyes: Negative for photophobia and visual disturbance.   Respiratory: Negative for apnea, cough, chest tightness, shortness of breath and wheezing.    Cardiovascular: Negative for chest pain, palpitations and leg swelling.   Gastrointestinal: Positive for abdominal pain and nausea. Negative for abdominal distention, constipation, diarrhea and vomiting.   Endocrine: Negative for polydipsia, polyphagia and polyuria.   Genitourinary: Negative for decreased urine volume, dysuria, flank pain, frequency and hematuria.   Musculoskeletal: Positive for back pain and neck pain. Negative for arthralgias, joint swelling, myalgias and neck stiffness.   Skin: Negative for pallor and rash.   Allergic/Immunologic: Negative for immunocompromised state.   Neurological: Positive for headaches. Negative for dizziness, seizures, syncope, weakness and numbness.   Psychiatric/Behavioral: Negative for confusion, hallucinations and suicidal ideas. The patient is not nervous/anxious.    All other systems reviewed and are negative.    Objective:     Vital Signs (Most Recent):  Temp: 98.7 °F (37.1 °C) (01/09/19 1224)  Pulse: 79 (01/09/19 1224)  Resp: 20 (01/09/19 1224)  BP: (!) 145/78 (01/09/19 1224)  SpO2: 99 % (01/09/19 1224) Vital Signs (24h Range):  Temp:  [98.6 °F (37 °C)-99.8 °F (37.7 °C)] 98.7 °F (37.1 °C)  Pulse:  [79-89] 79  Resp:  [16-20] 20  SpO2:  [95 %-100 %] 99 %  BP: (119-145)/(70-80) 145/78     Weight: 109.1 kg (240 lb 10.1 oz)  Body mass index is 32.64 kg/m².    Intake/Output Summary (Last 24 hours) at 1/9/2019 1703  Last data filed at 1/9/2019 1507  Gross per 24  hour   Intake 2226.67 ml   Output 1000 ml   Net 1226.67 ml      Physical Exam   Constitutional: He is oriented to person, place, and time. He appears well-developed and well-nourished. No distress.   HENT:   Head: Normocephalic and atraumatic.   Mouth/Throat: Oropharynx is clear and moist.   Eyes: Conjunctivae are normal. Pupils are equal, round, and reactive to light. No scleral icterus.   Neck: Normal range of motion. Neck supple. No JVD present. No thyromegaly present.   Cardiovascular: Normal rate and regular rhythm. Exam reveals no gallop and no friction rub.   No murmur heard.  Pulmonary/Chest: Effort normal and breath sounds normal. No respiratory distress. He has no wheezes. He has no rales.   Abdominal: Soft. Bowel sounds are normal. He exhibits no distension. There is tenderness (mild ). There is no guarding.   Musculoskeletal: Normal range of motion.   Neurological: He is alert and oriented to person, place, and time. No cranial nerve deficit.   Skin: Skin is warm. Capillary refill takes less than 2 seconds. He is not diaphoretic. No erythema.   Psychiatric: He has a normal mood and affect.   Nursing note and vitals reviewed.      Significant Labs:   CBC:   Recent Labs   Lab 01/07/19 2148 01/08/19  0420 01/09/19  0545   WBC 9.45 12.59 7.18   HGB 15.2 13.0* 13.0*   HCT 43.9 39.2* 40.5   * 135* 115*     CMP:   Recent Labs   Lab 01/07/19 2148 01/08/19  0420 01/09/19  0545    138 138   K 3.5 4.4 4.2    104 106   CO2 21* 22* 23   GLU 91 91 86   BUN 18 16 11   CREATININE 1.2 1.2 0.9   CALCIUM 9.3 8.7 8.9   PROT 7.0 6.4 6.4   ALBUMIN 4.0 3.5 3.2*   BILITOT 2.5* 1.4* 1.4*   ALKPHOS 111 96 96   * 130* 56*   * 105* 67*   ANIONGAP 12 12 9   EGFRNONAA >60 >60 >60       Significant Imaging:   Imaging Results          US Abdomen Limited (Gallbladder) (Final result)  Result time 01/07/19 23:50:53    Final result by Bob Corey MD (01/07/19 23:50:53)                 Impression:       There is some mild wall thickening of the gallbladder but no stones are demonstrated.  The Swain sign is negative..      Electronically signed by: Remigio Maldonado MD  Date:    01/07/2019  Time:    23:50             Narrative:    EXAMINATION:  US ABDOMEN LIMITED    CLINICAL HISTORY:  Epigastric pain    TECHNIQUE:  Limited ultrasound of the right upper quadrant of the abdomen (including pancreas, liver, gallbladder, common bile duct, and right kidney) was performed.    COMPARISON:  01/07/2019 CT scan    FINDINGS:  Liver: Normal in size. The liver demonstrates homogenous echotexture. There is a 1.11 x 1.31 x 1.32 cm cyst in the inferior portion right lobe of liver which matches the findings on ultrasound.    Biliary system: The gallbladder demonstrates no evidence of calculi. The gallbladder wall appears mildly thickened measuring 0.26 cm.  No gallstones are identified.  Swain sign is negative.  The common duct is not dilated, measuring 0.55 cm. No intrahepatic ductal dilatation.    Pancreas: Much of the pancreas is obscured by overlying bowel gas.    Right kidney: Normal in size measuring 10.10 cmwith no hydronephrosis, mass, or calculi.    Vascular: The portions of the aorta, vena cava, and portal vein appear free of acute abnormality.                               CT Abdomen Pelvis With Contrast (Final result)  Result time 01/07/19 23:28:46    Final result by Bob Maldonado MD (01/07/19 23:28:46)                 Impression:      The findings are suspicious for cholecystitis with inflamed appearing gallbladder.  If clinically indicated, ultrasound may prove helpful in the further evaluation of this patient.    All CT scans at (this location) are performed using dose modulation techniques as appropriate to a performed exam including the following:  automated exposure control; adjustment of the mA and /or kV according to patient size (this includes techniques or standardized protocols for targeted exams  where dose is matched to indication/reason for exam: i.e. extremities or head); use of iterative reconstruction technique.      Electronically signed by: Remigio Maldonado MD  Date:    01/07/2019  Time:    23:28             Narrative:    EXAMINATION:  CT ABDOMEN PELVIS WITH CONTRAST    CLINICAL HISTORY:  pancreatitis, elevated LFTs;    TECHNIQUE:  Low dose axial images, sagittal and coronal reformations were obtained from the lung bases to the pubic symphysis following the IV administration of 75 mL of Omnipaque 350 .    COMPARISON:  None.    FINDINGS:  ABDOMEN    Lung bases: Unremarkable    Liver/gallbladder/biliary: The liver demonstrates no suspicious findings.  Incidental note is made of a small cyst in the right lobe.  It measures 1.9 x 1.5 cm on the inferior aspect of the right lobe of liver..  The gallbladder is normal in size.  There is streaky increased attenuation of the gallbladder wall raising concern for changes of cholecystitis with some increased markings in the adjacent fat.  The common bile duct appears within normal limits.No biliary ductal dilation.    Pancreas: The pancreas is unremarkable in appearance.    Spleen: The spleen is not enlarged.    Adrenals: Unremarkable    Kidneys: The kidneys are equally perfused and demonstrate no solid masses.    Bowel/Mesentery: There is no evidence of bowel obstruction. There is diverticulosis of the colon.  The appendix is normal in appearance.  No mesenteric stranding or adenopathy.    Retroperitoneum: No adenopathy.The aorta demonstrates a normal caliber.    PELVIS:    Genitourinary/Reproductive organs: Unremarkable    Adenopathy: None    Free Fluid: No free fluid    Osseus Structures/Soft tissues: No suspicious appearing osseus lesions.  Fat filled inguinal hernias are present bilaterally.                               X-Ray Chest PA And Lateral (Final result)  Result time 01/07/19 22:18:20    Final result by SHIRLEY Corcoran Sr., MD (01/07/19 22:18:20)                  Impression:      1. The lungs are clear.  2. There is mild cardiomegaly.  .      Electronically signed by: Quentin Corcoran MD  Date:    01/07/2019  Time:    22:18             Narrative:    EXAMINATION:  XR CHEST PA AND LATERAL    CLINICAL HISTORY:  Chest Pain;    COMPARISON:  None    FINDINGS:  There is mild cardiomegaly.  The lungs are clear. There is no pneumothorax or pleural effusion.

## 2019-01-10 LAB
ALBUMIN SERPL BCP-MCNC: 3 G/DL
ALP SERPL-CCNC: 84 U/L
ALT SERPL W/O P-5'-P-CCNC: 44 U/L
AMYLASE SERPL-CCNC: 64 U/L
ANION GAP SERPL CALC-SCNC: 12 MMOL/L
AST SERPL-CCNC: 30 U/L
BASOPHILS # BLD AUTO: 0.01 K/UL
BASOPHILS NFR BLD: 0.2 %
BILIRUB SERPL-MCNC: 1 MG/DL
BUN SERPL-MCNC: 8 MG/DL
CALCIUM SERPL-MCNC: 8.7 MG/DL
CHLORIDE SERPL-SCNC: 108 MMOL/L
CO2 SERPL-SCNC: 19 MMOL/L
CREAT SERPL-MCNC: 0.9 MG/DL
DIFFERENTIAL METHOD: ABNORMAL
EOSINOPHIL # BLD AUTO: 0.2 K/UL
EOSINOPHIL NFR BLD: 3.6 %
ERYTHROCYTE [DISTWIDTH] IN BLOOD BY AUTOMATED COUNT: 14.1 %
EST. GFR  (AFRICAN AMERICAN): >60 ML/MIN/1.73 M^2
EST. GFR  (NON AFRICAN AMERICAN): >60 ML/MIN/1.73 M^2
GLUCOSE SERPL-MCNC: 84 MG/DL
HCT VFR BLD AUTO: 38.7 %
HGB BLD-MCNC: 12.6 G/DL
LIPASE SERPL-CCNC: 122 U/L
LYMPHOCYTES # BLD AUTO: 0.8 K/UL
LYMPHOCYTES NFR BLD: 13.3 %
MAGNESIUM SERPL-MCNC: 1.8 MG/DL
MCH RBC QN AUTO: 29.6 PG
MCHC RBC AUTO-ENTMCNC: 32.6 G/DL
MCV RBC AUTO: 91 FL
MONOCYTES # BLD AUTO: 0.7 K/UL
MONOCYTES NFR BLD: 11.8 %
NEUTROPHILS # BLD AUTO: 4.4 K/UL
NEUTROPHILS NFR BLD: 71.1 %
PHOSPHATE SERPL-MCNC: 1.9 MG/DL
PLATELET # BLD AUTO: 112 K/UL
PMV BLD AUTO: 9.4 FL
POTASSIUM SERPL-SCNC: 3.7 MMOL/L
PROT SERPL-MCNC: 6.2 G/DL
RBC # BLD AUTO: 4.26 M/UL
SODIUM SERPL-SCNC: 139 MMOL/L
WBC # BLD AUTO: 6.18 K/UL

## 2019-01-10 PROCEDURE — 63600175 PHARM REV CODE 636 W HCPCS: Performed by: NURSE PRACTITIONER

## 2019-01-10 PROCEDURE — 11000001 HC ACUTE MED/SURG PRIVATE ROOM

## 2019-01-10 PROCEDURE — 25000003 PHARM REV CODE 250: Performed by: INTERNAL MEDICINE

## 2019-01-10 PROCEDURE — 85025 COMPLETE CBC W/AUTO DIFF WBC: CPT

## 2019-01-10 PROCEDURE — 99222 PR INITIAL HOSPITAL CARE,LEVL II: ICD-10-PCS | Mod: ,,, | Performed by: PHYSICIAN ASSISTANT

## 2019-01-10 PROCEDURE — 99222 1ST HOSP IP/OBS MODERATE 55: CPT | Mod: ,,, | Performed by: PHYSICIAN ASSISTANT

## 2019-01-10 PROCEDURE — 80053 COMPREHEN METABOLIC PANEL: CPT

## 2019-01-10 PROCEDURE — 83735 ASSAY OF MAGNESIUM: CPT

## 2019-01-10 PROCEDURE — 82150 ASSAY OF AMYLASE: CPT

## 2019-01-10 PROCEDURE — C9113 INJ PANTOPRAZOLE SODIUM, VIA: HCPCS | Performed by: HOSPITALIST

## 2019-01-10 PROCEDURE — 83690 ASSAY OF LIPASE: CPT

## 2019-01-10 PROCEDURE — 63600175 PHARM REV CODE 636 W HCPCS: Performed by: HOSPITALIST

## 2019-01-10 PROCEDURE — 36415 COLL VENOUS BLD VENIPUNCTURE: CPT

## 2019-01-10 PROCEDURE — 25000003 PHARM REV CODE 250: Performed by: NURSE PRACTITIONER

## 2019-01-10 PROCEDURE — 84100 ASSAY OF PHOSPHORUS: CPT

## 2019-01-10 PROCEDURE — S4991 NICOTINE PATCH NONLEGEND: HCPCS | Performed by: NURSE PRACTITIONER

## 2019-01-10 RX ORDER — IBUPROFEN 400 MG/1
400 TABLET ORAL EVERY 6 HOURS PRN
Status: DISCONTINUED | OUTPATIENT
Start: 2019-01-10 | End: 2019-01-12 | Stop reason: HOSPADM

## 2019-01-10 RX ADMIN — PANTOPRAZOLE SODIUM 40 MG: 40 INJECTION, POWDER, LYOPHILIZED, FOR SOLUTION INTRAVENOUS at 11:01

## 2019-01-10 RX ADMIN — PIPERACILLIN AND TAZOBACTAM 4.5 G: 4; .5 INJECTION, POWDER, LYOPHILIZED, FOR SOLUTION INTRAVENOUS; PARENTERAL at 05:01

## 2019-01-10 RX ADMIN — MORPHINE SULFATE 4 MG: 10 INJECTION, SOLUTION INTRAMUSCULAR; INTRAVENOUS at 03:01

## 2019-01-10 RX ADMIN — MORPHINE SULFATE 4 MG: 10 INJECTION, SOLUTION INTRAMUSCULAR; INTRAVENOUS at 12:01

## 2019-01-10 RX ADMIN — SODIUM CHLORIDE: 0.9 INJECTION, SOLUTION INTRAVENOUS at 12:01

## 2019-01-10 RX ADMIN — ACETAMINOPHEN 650 MG: 325 TABLET ORAL at 08:01

## 2019-01-10 RX ADMIN — Medication 1 PATCH: at 09:01

## 2019-01-10 RX ADMIN — LORAZEPAM 0.5 MG: 2 INJECTION INTRAMUSCULAR; INTRAVENOUS at 04:01

## 2019-01-10 RX ADMIN — OXYMETAZOLINE HYDROCHLORIDE 2 SPRAY: 5 SPRAY NASAL at 08:01

## 2019-01-10 RX ADMIN — MORPHINE SULFATE 4 MG: 10 INJECTION, SOLUTION INTRAMUSCULAR; INTRAVENOUS at 06:01

## 2019-01-10 RX ADMIN — BUTALBITAL, ACETAMINOPHEN, AND CAFFEINE 1 TABLET: 50; 325; 40 TABLET ORAL at 08:01

## 2019-01-10 RX ADMIN — ENOXAPARIN SODIUM 40 MG: 100 INJECTION SUBCUTANEOUS at 05:01

## 2019-01-10 RX ADMIN — PIPERACILLIN AND TAZOBACTAM 4.5 G: 4; .5 INJECTION, POWDER, LYOPHILIZED, FOR SOLUTION INTRAVENOUS; PARENTERAL at 11:01

## 2019-01-10 NOTE — CONSULTS
[facility] -   General Surgery  Consult Note    Patient Name: Des Dash  MRN: [id number]  Code Status: Full Code  Admission Date: 1/7/2019  Hospital Length of Stay: 2 days  Attending Physician: Manuel Darby MD  Primary Care Provider: Juan José Batres MD    Patient information was obtained from patient, past medical records and ER records.     Inpatient consult to General Surgery  Consult performed by: Karla Spivey PA-C  Consult ordered by: Manuel Darby MD        Subjective:     Principal Problem: Acute pancreatitis    History of Present Illness: Des Dash is a 58 y.o. male wo presented 3 days ago with acute pancreatitis also noted to have elvated bilirubin and transaminases. Lipase and LFT's are trending down and nearly normal. His pain is overall improving and he denies nasuea or emesis. He c/o epigastric pain after clear liquids. He had a fever last night of 102. US and CT negative for cholelithiasis. Pt reports over the last several months he has had about 5 episodes of colicky epigastric/ruq pain after meals. Usually sx resolve within 30 minutes to 1 hour.   General Surgery was consulted for further recommendations.       No current facility-administered medications on file prior to encounter.      Current Outpatient Medications on File Prior to Encounter   Medication Sig    ALPRAZolam (XANAX) 1 MG tablet Take 1 mg by mouth.    aspirin (ECOTRIN) 81 MG EC tablet Take 81 mg by mouth once daily.    dextroamphetamine-amphetamine (ADDERALL) 10 mg Tab Take 10 mg by mouth.    dicyclomine (BENTYL) 10 MG capsule Take 10 mg by mouth.    esomeprazole (NEXIUM) 40 MG capsule Take 40 mg by mouth.    fluticasone (FLONASE) 50 mcg/actuation nasal spray 2 sprays by Nasal route.    niacin (NIASPAN) 750 MG CR tablet Take 750 mg by mouth.    pravastatin (PRAVACHOL) 80 MG tablet Take 80 mg by mouth.    sotalol (BETAPACE) 80 MG tablet Take 80 mg by mouth.    traMADol (ULTRAM)  50 mg tablet Take 50 mg by mouth.       Review of patient's allergies indicates:  No Known Allergies    Past Medical History:   Diagnosis Date    SARAH (obstructive sleep apnea)     Tobacco abuse     1ppd x 42 years     History reviewed. No pertinent surgical history.  Family History     None        Tobacco Use    Smoking status: Current Every Day Smoker    Smokeless tobacco: Never Used   Substance and Sexual Activity    Alcohol use: No     Frequency: Never    Drug use: No    Sexual activity: Not Currently     Review of Systems   Constitutional: Positive for chills. Negative for activity change and fever.   Respiratory: Negative for shortness of breath.    Cardiovascular: Negative for chest pain.   Gastrointestinal: Positive for abdominal pain and nausea. Negative for vomiting.   Genitourinary: Negative for dysuria.   Musculoskeletal: Negative for myalgias.   Skin: Negative for wound.   Neurological: Negative for weakness.   Hematological: Does not bruise/bleed easily.   Psychiatric/Behavioral: The patient is not nervous/anxious.      Objective:     Vital Signs (Most Recent):  Temp: 98.2 °F (36.8 °C) (01/10/19 0722)  Pulse: 85 (01/10/19 0722)  Resp: 18 (01/10/19 0722)  BP: (!) 140/72 (01/10/19 0722)  SpO2: (!) 94 % (01/10/19 0722) Vital Signs (24h Range):  Temp:  [98.1 °F (36.7 °C)-102 °F (38.9 °C)] 98.2 °F (36.8 °C)  Pulse:  [81-96] 85  Resp:  [18-20] 18  SpO2:  [94 %-98 %] 94 %  BP: (137-145)/(64-73) 140/72     Weight: 109.1 kg (240 lb 10.1 oz)  Body mass index is 32.64 kg/m².    Physical Exam   Constitutional: He is oriented to person, place, and time. He appears well-developed and well-nourished.   HENT:   Head: Normocephalic and atraumatic.   Eyes: EOM are normal. No scleral icterus.   Cardiovascular: Normal rate and regular rhythm.   Pulmonary/Chest: Effort normal and breath sounds normal. No respiratory distress.   Abdominal: Soft. There is tenderness (mild epigastric ttp).   Obese abdomen    Musculoskeletal: Normal range of motion.   Neurological: He is alert and oriented to person, place, and time.   Skin: Skin is warm and dry.   Psychiatric: He has a normal mood and affect. Thought content normal.   Vitals reviewed.      Significant Labs:  CBC:   Recent Labs   Lab 01/10/19  0459   WBC 6.18   RBC 4.26*   HGB 12.6*   HCT 38.7*   *   MCV 91   MCH 29.6   MCHC 32.6     CMP:   Recent Labs   Lab 01/10/19  0459   GLU 84   CALCIUM 8.7   ALBUMIN 3.0*   PROT 6.2      K 3.7   CO2 19*      BUN 8   CREATININE 0.9   ALKPHOS 84   ALT 44   AST 30   BILITOT 1.0       Significant Diagnostics:  I have reviewed all pertinent imaging results/findings within the past 24 hours.    Assessment/Plan:     * Acute pancreatitis    Likely 2/2 gallstones  Lipase and LFT's trending down. No leukocytosis  Pt with fever overnight  Will get HIDA scan for further eval  He will likely need cholecystectomy. If not needed during this admission, this can be done electively in outpatient setting.       VTE Risk Mitigation (From admission, onward)        Ordered     enoxaparin injection 40 mg  Daily      01/08/19 0103     IP VTE HIGH RISK PATIENT  Once      01/08/19 0103          Thank you for your consult. I will follow-up with patient. Please contact us if you have any additional questions.    Karla Spivey PA-C  General Surgery  [facility]

## 2019-01-10 NOTE — ASSESSMENT & PLAN NOTE
- in the setting of pancreatiis  - normalized on 1/9/19  - hold statin  - CPK and hep panel negative

## 2019-01-10 NOTE — ASSESSMENT & PLAN NOTE
- likely related passage of gallstones  - p/w epigastric pain, n/v  - CT/US shows suspected cholecystitis  - lipase 812>>296>>122  - amylase and LFTs normalized on 1/9/19  - diagnosis based on sx and labs  - aggressive IV hydration with 200cc/hr of NS  - morphine prn pain  - zofran/promethazine prn nausea  - diet advanced to clear liquids- pt did not tolerate on 1/8/19  - GI ppx with protonix  - lipid panel, UDS, ethanol level to eval for etiology  - case discussed with GI with no further recommendations   - General Surgery consulted

## 2019-01-10 NOTE — NURSING
Manuel Baron called with lab from [facility] in [place]. Stated that the blood cultures from 01/09/2019 at 8pm both anaerobic and aerobic are gram negative rods. Notified both Dr. Darby and Lidia Singleton NP via secure chat.

## 2019-01-10 NOTE — PROGRESS NOTES
[facility] Hospital Medicine  Progress Note    Patient Name: Des Dash  MRN: [id number]  Patient Class: IP- Inpatient   Admission Date: 1/7/2019  Length of Stay: 2 days  Attending Physician: Manuel Darby MD  Primary Care Provider: Juan José Batres MD        Subjective:     Principal Problem:Acute pancreatitis    HPI:  58M h/o HA presents with epigastric pain x 1 day.  Onset sudden, burning characteristic.  Associated with N/V x 2.   Also c/o HA.  Denies fever, chills, diarrhea, constipation, hematochezia, melena, dysuria, hematuria, frequency, cough, congestion, and all other sxs at this time.   Denies ETOH use.  Current smoker.  In ER,  Lipase >1000.  CT/US negative for cholecystitis and pancreatitis.  Patient given 1L NS bolus.         Hospital Course:  Pt admitted to Medical Surgical Unit for acute pancreatitis.  CT of abdomen showed findings suspicious for cholecystitis with inflamed appearing gallbladder.  US showed mild wall thickening of the gallbladder with no stones.  Elevated amylase, lipase, and LFTs noted.  Pt treated with IV hydration, bowel rest, analgesia prn, and antiemetics prn.  Lipase and LFTs trending down.  Pt verbalized symptom improvement with N/V and abdominal pain denied.  Pt smokes 1 ppd x 42 years and counseled on smoking cessation with understanding verified.  Diet advanced and pt unable to tolerate.  NPO status resumed with abdominal complaints continued.  Case discussed with GI with no further recommendations.  General Surgery consulted for further evaluation. Amylase and LFTs normalized with Lipase trending down.  Will initiate clear liquids.      Interval History: pt reports continued abdominal pain with NPO status maintained.  Case discussed with GI and no further recommendations noted.  General Surgery consulted.  Amylase and LFTs normalized with Lipase trending down.  Temp of 102 noted yesterday with UA, chest xray, and blood cultures obtained.       Review of Systems   Constitutional: Negative for activity change, appetite change, chills, diaphoresis, fatigue.  + fever   HENT: Negative for facial swelling, sore throat, tinnitus and trouble swallowing.    Eyes: Negative for photophobia and visual disturbance.   Respiratory: Negative for apnea, cough, chest tightness, shortness of breath and wheezing.    Cardiovascular: Negative for chest pain, palpitations and leg swelling.   Gastrointestinal: Positive for abdominal distention and abdominal pain. Negative for constipation, diarrhea, nausea and vomiting.   Endocrine: Negative for polydipsia, polyphagia and polyuria.   Genitourinary: Negative for decreased urine volume, dysuria, flank pain, frequency and hematuria.   Musculoskeletal: Positive for back pain and neck pain. Negative for arthralgias, joint swelling, myalgias and neck stiffness.   Skin: Negative for pallor and rash.   Allergic/Immunologic: Negative for immunocompromised state.   Neurological: Negative for dizziness, seizures, syncope, weakness, numbness and headaches.   Psychiatric/Behavioral: Negative for confusion, hallucinations and suicidal ideas. The patient is not nervous/anxious.    All other systems reviewed and are negative.    Objective:     Vital Signs (Most Recent):  Temp: 98.2 °F (36.8 °C) (01/10/19 0722)  Pulse: 85 (01/10/19 0722)  Resp: 18 (01/10/19 0722)  BP: (!) 140/72 (01/10/19 0722)  SpO2: (!) 94 % (01/10/19 0722) Vital Signs (24h Range):  Temp:  [98.1 °F (36.7 °C)-102 °F (38.9 °C)] 98.2 °F (36.8 °C)  Pulse:  [81-96] 85  Resp:  [18-20] 18  SpO2:  [94 %-98 %] 94 %  BP: (137-145)/(64-73) 140/72     Weight: 109.1 kg (240 lb 10.1 oz)  Body mass index is 32.64 kg/m².    Intake/Output Summary (Last 24 hours) at 1/10/2019 1328  Last data filed at 1/10/2019 1100  Gross per 24 hour   Intake 4900 ml   Output 2500 ml   Net 2400 ml      Physical Exam   Constitutional: He is oriented to person, place, and time. He appears well-developed and  well-nourished. No distress.   HENT:   Head: Normocephalic and atraumatic.   Mouth/Throat: Oropharynx is clear and moist.   Eyes: Conjunctivae are normal. Pupils are equal, round, and reactive to light. No scleral icterus.   Neck: Normal range of motion. Neck supple. No JVD present. No thyromegaly present.   Cardiovascular: Normal rate and regular rhythm. Exam reveals no gallop and no friction rub.   No murmur heard.  Pulmonary/Chest: Effort normal and breath sounds normal. No respiratory distress. He has no wheezes. He has no rales.   Abdominal: Soft. Bowel sounds are normal. He exhibits no distension. There is tenderness (mild ). There is no guarding.   Genitourinary:   Genitourinary Comments: Deferred   Musculoskeletal: Normal range of motion.   Neurological: He is alert and oriented to person, place, and time. No cranial nerve deficit.   Skin: Skin is warm. Capillary refill takes less than 2 seconds. He is not diaphoretic. No erythema.   Psychiatric: He has a normal mood and affect.   Nursing note and vitals reviewed.      Significant Labs:   CBC:   Recent Labs   Lab 01/09/19  0545 01/10/19  0459   WBC 7.18 6.18   HGB 13.0* 12.6*   HCT 40.5 38.7*   * 112*     CMP:   Recent Labs   Lab 01/09/19  0545 01/10/19  0459    139   K 4.2 3.7    108   CO2 23 19*   GLU 86 84   BUN 11 8   CREATININE 0.9 0.9   CALCIUM 8.9 8.7   PROT 6.4 6.2   ALBUMIN 3.2* 3.0*   BILITOT 1.4* 1.0   ALKPHOS 96 84   AST 56* 30   ALT 67* 44   ANIONGAP 9 12   EGFRNONAA >60 >60     Magnesium:   Recent Labs   Lab 01/09/19  0545 01/10/19  0459   MG 2.2 1.8       Significant Imaging:   Imaging Results          US Abdomen Limited (Gallbladder) (Final result)  Result time 01/07/19 23:50:53    Final result by Bob Corey MD (01/07/19 23:50:53)                 Impression:      There is some mild wall thickening of the gallbladder but no stones are demonstrated.  The Swain sign is negative..      Electronically signed by: Remigio  MD Joel  Date:    01/07/2019  Time:    23:50             Narrative:    EXAMINATION:  US ABDOMEN LIMITED    CLINICAL HISTORY:  Epigastric pain    TECHNIQUE:  Limited ultrasound of the right upper quadrant of the abdomen (including pancreas, liver, gallbladder, common bile duct, and right kidney) was performed.    COMPARISON:  01/07/2019 CT scan    FINDINGS:  Liver: Normal in size. The liver demonstrates homogenous echotexture. There is a 1.11 x 1.31 x 1.32 cm cyst in the inferior portion right lobe of liver which matches the findings on ultrasound.    Biliary system: The gallbladder demonstrates no evidence of calculi. The gallbladder wall appears mildly thickened measuring 0.26 cm.  No gallstones are identified.  Swain sign is negative.  The common duct is not dilated, measuring 0.55 cm. No intrahepatic ductal dilatation.    Pancreas: Much of the pancreas is obscured by overlying bowel gas.    Right kidney: Normal in size measuring 10.10 cmwith no hydronephrosis, mass, or calculi.    Vascular: The portions of the aorta, vena cava, and portal vein appear free of acute abnormality.                               CT Abdomen Pelvis With Contrast (Final result)  Result time 01/07/19 23:28:46    Final result by Bob Corey MD (01/07/19 23:28:46)                 Impression:      The findings are suspicious for cholecystitis with inflamed appearing gallbladder.  If clinically indicated, ultrasound may prove helpful in the further evaluation of this patient.    All CT scans at (this location) are performed using dose modulation techniques as appropriate to a performed exam including the following:  automated exposure control; adjustment of the mA and /or kV according to patient size (this includes techniques or standardized protocols for targeted exams where dose is matched to indication/reason for exam: i.e. extremities or head); use of iterative reconstruction technique.      Electronically signed by: Remigio  MD Joel  Date:    01/07/2019  Time:    23:28             Narrative:    EXAMINATION:  CT ABDOMEN PELVIS WITH CONTRAST    CLINICAL HISTORY:  pancreatitis, elevated LFTs;    TECHNIQUE:  Low dose axial images, sagittal and coronal reformations were obtained from the lung bases to the pubic symphysis following the IV administration of 75 mL of Omnipaque 350 .    COMPARISON:  None.    FINDINGS:  ABDOMEN    Lung bases: Unremarkable    Liver/gallbladder/biliary: The liver demonstrates no suspicious findings.  Incidental note is made of a small cyst in the right lobe.  It measures 1.9 x 1.5 cm on the inferior aspect of the right lobe of liver..  The gallbladder is normal in size.  There is streaky increased attenuation of the gallbladder wall raising concern for changes of cholecystitis with some increased markings in the adjacent fat.  The common bile duct appears within normal limits.No biliary ductal dilation.    Pancreas: The pancreas is unremarkable in appearance.    Spleen: The spleen is not enlarged.    Adrenals: Unremarkable    Kidneys: The kidneys are equally perfused and demonstrate no solid masses.    Bowel/Mesentery: There is no evidence of bowel obstruction. There is diverticulosis of the colon.  The appendix is normal in appearance.  No mesenteric stranding or adenopathy.    Retroperitoneum: No adenopathy.The aorta demonstrates a normal caliber.    PELVIS:    Genitourinary/Reproductive organs: Unremarkable    Adenopathy: None    Free Fluid: No free fluid    Osseus Structures/Soft tissues: No suspicious appearing osseus lesions.  Fat filled inguinal hernias are present bilaterally.                               X-Ray Chest PA And Lateral (Final result)  Result time 01/07/19 22:18:20    Final result by SHIRLEY Corcoran Sr., MD (01/07/19 22:18:20)                 Impression:      1. The lungs are clear.  2. There is mild cardiomegaly.  .      Electronically signed by: Qeuntin Corcoran  MD  Date:    01/07/2019  Time:    22:18             Narrative:    EXAMINATION:  XR CHEST PA AND LATERAL    CLINICAL HISTORY:  Chest Pain;    COMPARISON:  None    FINDINGS:  There is mild cardiomegaly.  The lungs are clear. There is no pneumothorax or pleural effusion.                              Assessment/Plan:      * Acute pancreatitis    - likely related passage of gallstones  - p/w epigastric pain, n/v  - CT/US shows suspected cholecystitis  - lipase 812>>296>>122  - amylase and LFTs normalized on 1/9/19  - diagnosis based on sx and labs  - aggressive IV hydration with 200cc/hr of NS  - morphine prn pain  - zofran/promethazine prn nausea  - diet advanced to clear liquids- pt did not tolerate on 1/8/19  - GI ppx with protonix  - lipid panel, UDS, ethanol level to eval for etiology  - case discussed with GI with no further recommendations   - General Surgery consulted        SARAH (obstructive sleep apnea)    CPAP nightly        Tobacco abuse    -Nicotine patch  -pt counseled on smoking cessation with understanding verbalized        Transaminitis    - in the setting of pancreatiis  - normalized on 1/9/19  - hold statin  - CPK and hep panel negative         VTE Risk Mitigation (From admission, onward)        Ordered     enoxaparin injection 40 mg  Daily      01/08/19 0103     IP VTE HIGH RISK PATIENT  Once      01/08/19 0103              Lidia Singleton NP  Department of Hospital Medicine   [facility]

## 2019-01-10 NOTE — ASSESSMENT & PLAN NOTE
Likely 2/2 gallstones  Lipase and LFT's trending down. No leukocytosis  Pt with fever overnight  Will get HIDA scan for further eval  He will likely need cholecystectomy. If not needed during this admission, this can be done electively in outpatient setting.

## 2019-01-10 NOTE — PLAN OF CARE
Problem: Adult Inpatient Plan of Care  Goal: Plan of Care Review  Outcome: Ongoing (interventions implemented as appropriate)  Fall precautions maintained. Pt free from falls/injuries.  Patient complains of pain. Pain controlled with PRN meds.  Antibiotics given as prescribed.  Ambulates and repositions independently.   Plan of care and medications discussed with patient.  Patient verbalized understanding.  Bed locked and low, call bell within reach.  Chart check done. Will continue to monitor.

## 2019-01-10 NOTE — PLAN OF CARE
Problem: Adult Inpatient Plan of Care  Goal: Plan of Care Review  Outcome: Ongoing (interventions implemented as appropriate)  POC reviewed, including indications and possible side effects of administered medications. Patient verbalized understanding and teach back. No adverse reactions noted. Patient c/o headache and abdominal pain. Administered medications per order. Patient remains NPO with ice and sips with meds and tolerating well. Denies n/v. CPap worn at night. VSS. Afebrile. Patient remains free of falls and injuries during shift. Will continue to monitor.    Chart check complete.

## 2019-01-10 NOTE — PROGRESS NOTES
Pharmacist Renal Dose Adjustment Note    Des Dash is a 58 y.o. male being treated with the medication piperacillin-tazobactam.    Patient Data:    Vital Signs (Most Recent):  Temp: 98.2 °F (36.8 °C) (01/10/19 0722)  Pulse: 85 (01/10/19 0722)  Resp: 18 (01/10/19 0722)  BP: (!) 140/72 (01/10/19 0722)  SpO2: (!) 94 % (01/10/19 0722)   Vital Signs (72h Range):  Temp:  [98.1 °F (36.7 °C)-102 °F (38.9 °C)]   Pulse:  [79-96]   Resp:  [16-20]   BP: (116-145)/(60-80)   SpO2:  [94 %-100 %]      Recent Labs   Lab 01/08/19  0420 01/09/19  0545 01/10/19  0459   CREATININE 1.2 0.9 0.9     Serum creatinine: 0.9 mg/dL 01/10/19 0459  Estimated creatinine clearance: 111.4 mL/min    Medication: piperacillin-tazobactam 4.5 g IV every 12 hours will be changed to piperacillin-tazobactam 4.5 g IV every 8 hours.    Pharmacist's Name: Melissa Michelle PharmD  Pharmacist's Extension: [phone]    Thank you for allowing us to participate in this patient's care.     Melissa Michelle PharmD 01/10/2019 3:08 PM

## 2019-01-10 NOTE — SUBJECTIVE & OBJECTIVE
No current facility-administered medications on file prior to encounter.      Current Outpatient Medications on File Prior to Encounter   Medication Sig    ALPRAZolam (XANAX) 1 MG tablet Take 1 mg by mouth.    aspirin (ECOTRIN) 81 MG EC tablet Take 81 mg by mouth once daily.    dextroamphetamine-amphetamine (ADDERALL) 10 mg Tab Take 10 mg by mouth.    dicyclomine (BENTYL) 10 MG capsule Take 10 mg by mouth.    esomeprazole (NEXIUM) 40 MG capsule Take 40 mg by mouth.    fluticasone (FLONASE) 50 mcg/actuation nasal spray 2 sprays by Nasal route.    niacin (NIASPAN) 750 MG CR tablet Take 750 mg by mouth.    pravastatin (PRAVACHOL) 80 MG tablet Take 80 mg by mouth.    sotalol (BETAPACE) 80 MG tablet Take 80 mg by mouth.    traMADol (ULTRAM) 50 mg tablet Take 50 mg by mouth.       Review of patient's allergies indicates:  No Known Allergies    Past Medical History:   Diagnosis Date    SARAH (obstructive sleep apnea)     Tobacco abuse     1ppd x 42 years     History reviewed. No pertinent surgical history.  Family History     None        Tobacco Use    Smoking status: Current Every Day Smoker    Smokeless tobacco: Never Used   Substance and Sexual Activity    Alcohol use: No     Frequency: Never    Drug use: No    Sexual activity: Not Currently     Review of Systems   Constitutional: Positive for chills. Negative for activity change and fever.   Respiratory: Negative for shortness of breath.    Cardiovascular: Negative for chest pain.   Gastrointestinal: Positive for abdominal pain and nausea. Negative for vomiting.   Genitourinary: Negative for dysuria.   Musculoskeletal: Negative for myalgias.   Skin: Negative for wound.   Neurological: Negative for weakness.   Hematological: Does not bruise/bleed easily.   Psychiatric/Behavioral: The patient is not nervous/anxious.      Objective:     Vital Signs (Most Recent):  Temp: 98.2 °F (36.8 °C) (01/10/19 0722)  Pulse: 85 (01/10/19 0722)  Resp: 18 (01/10/19  0722)  BP: (!) 140/72 (01/10/19 0722)  SpO2: (!) 94 % (01/10/19 0722) Vital Signs (24h Range):  Temp:  [98.1 °F (36.7 °C)-102 °F (38.9 °C)] 98.2 °F (36.8 °C)  Pulse:  [81-96] 85  Resp:  [18-20] 18  SpO2:  [94 %-98 %] 94 %  BP: (137-145)/(64-73) 140/72     Weight: 109.1 kg (240 lb 10.1 oz)  Body mass index is 32.64 kg/m².    Physical Exam   Constitutional: He is oriented to person, place, and time. He appears well-developed and well-nourished.   HENT:   Head: Normocephalic and atraumatic.   Eyes: EOM are normal. No scleral icterus.   Cardiovascular: Normal rate and regular rhythm.   Pulmonary/Chest: Effort normal and breath sounds normal. No respiratory distress.   Abdominal: Soft. There is tenderness (mild epigastric ttp).   Obese abdomen   Musculoskeletal: Normal range of motion.   Neurological: He is alert and oriented to person, place, and time.   Skin: Skin is warm and dry.   Psychiatric: He has a normal mood and affect. Thought content normal.   Vitals reviewed.      Significant Labs:  CBC:   Recent Labs   Lab 01/10/19  0459   WBC 6.18   RBC 4.26*   HGB 12.6*   HCT 38.7*   *   MCV 91   MCH 29.6   MCHC 32.6     CMP:   Recent Labs   Lab 01/10/19  0459   GLU 84   CALCIUM 8.7   ALBUMIN 3.0*   PROT 6.2      K 3.7   CO2 19*      BUN 8   CREATININE 0.9   ALKPHOS 84   ALT 44   AST 30   BILITOT 1.0       Significant Diagnostics:  I have reviewed all pertinent imaging results/findings within the past 24 hours.

## 2019-01-10 NOTE — SUBJECTIVE & OBJECTIVE
Interval History: pt reports continued abdominal pain with NPO status maintained.  Case discussed with GI and no further recommendations noted.  General Surgery consulted.  Amylase and LFTs normalized with Lipase trending down.      Review of Systems   Constitutional: Negative for activity change, appetite change, chills, diaphoresis, fatigue and fever.   HENT: Negative for facial swelling, sore throat, tinnitus and trouble swallowing.    Eyes: Negative for photophobia and visual disturbance.   Respiratory: Negative for apnea, cough, chest tightness, shortness of breath and wheezing.    Cardiovascular: Negative for chest pain, palpitations and leg swelling.   Gastrointestinal: Positive for abdominal distention and abdominal pain. Negative for constipation, diarrhea, nausea and vomiting.   Endocrine: Negative for polydipsia, polyphagia and polyuria.   Genitourinary: Negative for decreased urine volume, dysuria, flank pain, frequency and hematuria.   Musculoskeletal: Positive for back pain and neck pain. Negative for arthralgias, joint swelling, myalgias and neck stiffness.   Skin: Negative for pallor and rash.   Allergic/Immunologic: Negative for immunocompromised state.   Neurological: Negative for dizziness, seizures, syncope, weakness, numbness and headaches.   Psychiatric/Behavioral: Negative for confusion, hallucinations and suicidal ideas. The patient is not nervous/anxious.    All other systems reviewed and are negative.    Objective:     Vital Signs (Most Recent):  Temp: 98.2 °F (36.8 °C) (01/10/19 0722)  Pulse: 85 (01/10/19 0722)  Resp: 18 (01/10/19 0722)  BP: (!) 140/72 (01/10/19 0722)  SpO2: (!) 94 % (01/10/19 0722) Vital Signs (24h Range):  Temp:  [98.1 °F (36.7 °C)-102 °F (38.9 °C)] 98.2 °F (36.8 °C)  Pulse:  [81-96] 85  Resp:  [18-20] 18  SpO2:  [94 %-98 %] 94 %  BP: (137-145)/(64-73) 140/72     Weight: 109.1 kg (240 lb 10.1 oz)  Body mass index is 32.64 kg/m².    Intake/Output Summary (Last 24 hours)  at 1/10/2019 1328  Last data filed at 1/10/2019 1100  Gross per 24 hour   Intake 4900 ml   Output 2500 ml   Net 2400 ml      Physical Exam   Constitutional: He is oriented to person, place, and time. He appears well-developed and well-nourished. No distress.   HENT:   Head: Normocephalic and atraumatic.   Mouth/Throat: Oropharynx is clear and moist.   Eyes: Conjunctivae are normal. Pupils are equal, round, and reactive to light. No scleral icterus.   Neck: Normal range of motion. Neck supple. No JVD present. No thyromegaly present.   Cardiovascular: Normal rate and regular rhythm. Exam reveals no gallop and no friction rub.   No murmur heard.  Pulmonary/Chest: Effort normal and breath sounds normal. No respiratory distress. He has no wheezes. He has no rales.   Abdominal: Soft. Bowel sounds are normal. He exhibits no distension. There is tenderness (mild ). There is no guarding.   Genitourinary:   Genitourinary Comments: Deferred   Musculoskeletal: Normal range of motion.   Neurological: He is alert and oriented to person, place, and time. No cranial nerve deficit.   Skin: Skin is warm. Capillary refill takes less than 2 seconds. He is not diaphoretic. No erythema.   Psychiatric: He has a normal mood and affect.   Nursing note and vitals reviewed.      Significant Labs:   CBC:   Recent Labs   Lab 01/09/19  0545 01/10/19  0459   WBC 7.18 6.18   HGB 13.0* 12.6*   HCT 40.5 38.7*   * 112*     CMP:   Recent Labs   Lab 01/09/19  0545 01/10/19  0459    139   K 4.2 3.7    108   CO2 23 19*   GLU 86 84   BUN 11 8   CREATININE 0.9 0.9   CALCIUM 8.9 8.7   PROT 6.4 6.2   ALBUMIN 3.2* 3.0*   BILITOT 1.4* 1.0   ALKPHOS 96 84   AST 56* 30   ALT 67* 44   ANIONGAP 9 12   EGFRNONAA >60 >60     Magnesium:   Recent Labs   Lab 01/09/19  0545 01/10/19  0459   MG 2.2 1.8       Significant Imaging:   Imaging Results          US Abdomen Limited (Gallbladder) (Final result)  Result time 01/07/19 23:50:53    Final result by  Bob Maldonado MD (01/07/19 23:50:53)                 Impression:      There is some mild wall thickening of the gallbladder but no stones are demonstrated.  The Swain sign is negative..      Electronically signed by: Remigio Maldonado MD  Date:    01/07/2019  Time:    23:50             Narrative:    EXAMINATION:  US ABDOMEN LIMITED    CLINICAL HISTORY:  Epigastric pain    TECHNIQUE:  Limited ultrasound of the right upper quadrant of the abdomen (including pancreas, liver, gallbladder, common bile duct, and right kidney) was performed.    COMPARISON:  01/07/2019 CT scan    FINDINGS:  Liver: Normal in size. The liver demonstrates homogenous echotexture. There is a 1.11 x 1.31 x 1.32 cm cyst in the inferior portion right lobe of liver which matches the findings on ultrasound.    Biliary system: The gallbladder demonstrates no evidence of calculi. The gallbladder wall appears mildly thickened measuring 0.26 cm.  No gallstones are identified.  Swain sign is negative.  The common duct is not dilated, measuring 0.55 cm. No intrahepatic ductal dilatation.    Pancreas: Much of the pancreas is obscured by overlying bowel gas.    Right kidney: Normal in size measuring 10.10 cmwith no hydronephrosis, mass, or calculi.    Vascular: The portions of the aorta, vena cava, and portal vein appear free of acute abnormality.                               CT Abdomen Pelvis With Contrast (Final result)  Result time 01/07/19 23:28:46    Final result by Bob Maldonado MD (01/07/19 23:28:46)                 Impression:      The findings are suspicious for cholecystitis with inflamed appearing gallbladder.  If clinically indicated, ultrasound may prove helpful in the further evaluation of this patient.    All CT scans at (this location) are performed using dose modulation techniques as appropriate to a performed exam including the following:  automated exposure control; adjustment of the mA and /or kV according to patient size (this  includes techniques or standardized protocols for targeted exams where dose is matched to indication/reason for exam: i.e. extremities or head); use of iterative reconstruction technique.      Electronically signed by: Remigio Maldonado MD  Date:    01/07/2019  Time:    23:28             Narrative:    EXAMINATION:  CT ABDOMEN PELVIS WITH CONTRAST    CLINICAL HISTORY:  pancreatitis, elevated LFTs;    TECHNIQUE:  Low dose axial images, sagittal and coronal reformations were obtained from the lung bases to the pubic symphysis following the IV administration of 75 mL of Omnipaque 350 .    COMPARISON:  None.    FINDINGS:  ABDOMEN    Lung bases: Unremarkable    Liver/gallbladder/biliary: The liver demonstrates no suspicious findings.  Incidental note is made of a small cyst in the right lobe.  It measures 1.9 x 1.5 cm on the inferior aspect of the right lobe of liver..  The gallbladder is normal in size.  There is streaky increased attenuation of the gallbladder wall raising concern for changes of cholecystitis with some increased markings in the adjacent fat.  The common bile duct appears within normal limits.No biliary ductal dilation.    Pancreas: The pancreas is unremarkable in appearance.    Spleen: The spleen is not enlarged.    Adrenals: Unremarkable    Kidneys: The kidneys are equally perfused and demonstrate no solid masses.    Bowel/Mesentery: There is no evidence of bowel obstruction. There is diverticulosis of the colon.  The appendix is normal in appearance.  No mesenteric stranding or adenopathy.    Retroperitoneum: No adenopathy.The aorta demonstrates a normal caliber.    PELVIS:    Genitourinary/Reproductive organs: Unremarkable    Adenopathy: None    Free Fluid: No free fluid    Osseus Structures/Soft tissues: No suspicious appearing osseus lesions.  Fat filled inguinal hernias are present bilaterally.                               X-Ray Chest PA And Lateral (Final result)  Result time 01/07/19 22:18:20     Final result by SHIRLEY Corcoran Sr., MD (01/07/19 22:18:20)                 Impression:      1. The lungs are clear.  2. There is mild cardiomegaly.  .      Electronically signed by: Quentin Corcoran MD  Date:    01/07/2019  Time:    22:18             Narrative:    EXAMINATION:  XR CHEST PA AND LATERAL    CLINICAL HISTORY:  Chest Pain;    COMPARISON:  None    FINDINGS:  There is mild cardiomegaly.  The lungs are clear. There is no pneumothorax or pleural effusion.

## 2019-01-10 NOTE — NURSING
Notified Lidia Singleton NP of patient's elevated temp of 102.0. Gave 650 mg of Tylenol and applied ice bags. Will monitor.

## 2019-01-10 NOTE — HPI
Des Dash is a 58 y.o. male wo presented 3 days ago with acute pancreatitis also noted to have elvated bilirubin and transaminases. Lipase and LFT's are trending down and nearly normal. His pain is overall improving and he denies nasuea or emesis. He c/o epigastric pain after clear liquids. He had a fever last night of 102. US and CT negative for cholelithiasis. Pt reports over the last several months he has had about 5 episodes of colicky epigastric/ruq pain after meals. Usually sx resolve within 30 minutes to 1 hour.   General Surgery was consulted for further recommendations.

## 2019-01-11 ENCOUNTER — ANESTHESIA EVENT (OUTPATIENT)
Dept: SURGERY | Facility: HOSPITAL | Age: 59
DRG: 418 | End: 2019-01-11
Payer: MEDICARE

## 2019-01-11 ENCOUNTER — ANESTHESIA (OUTPATIENT)
Dept: SURGERY | Facility: HOSPITAL | Age: 59
DRG: 418 | End: 2019-01-11
Payer: MEDICARE

## 2019-01-11 PROBLEM — R78.81 BACTEREMIA: Status: ACTIVE | Noted: 2019-01-11

## 2019-01-11 PROBLEM — K81.9 CHOLECYSTITIS: Status: ACTIVE | Noted: 2019-01-11

## 2019-01-11 LAB
ALBUMIN SERPL BCP-MCNC: 3.2 G/DL
ALP SERPL-CCNC: 99 U/L
ALT SERPL W/O P-5'-P-CCNC: 38 U/L
ANION GAP SERPL CALC-SCNC: 13 MMOL/L
AST SERPL-CCNC: 31 U/L
BASOPHILS # BLD AUTO: 0.01 K/UL
BASOPHILS NFR BLD: 0.2 %
BILIRUB SERPL-MCNC: 1.5 MG/DL
BUN SERPL-MCNC: 8 MG/DL
CALCIUM SERPL-MCNC: 9.5 MG/DL
CHLORIDE SERPL-SCNC: 105 MMOL/L
CO2 SERPL-SCNC: 23 MMOL/L
CREAT SERPL-MCNC: 1 MG/DL
DIFFERENTIAL METHOD: ABNORMAL
EOSINOPHIL # BLD AUTO: 0.3 K/UL
EOSINOPHIL NFR BLD: 3.8 %
ERYTHROCYTE [DISTWIDTH] IN BLOOD BY AUTOMATED COUNT: 13.9 %
EST. GFR  (AFRICAN AMERICAN): >60 ML/MIN/1.73 M^2
EST. GFR  (NON AFRICAN AMERICAN): >60 ML/MIN/1.73 M^2
GLUCOSE SERPL-MCNC: 87 MG/DL
HCT VFR BLD AUTO: 40.3 %
HGB BLD-MCNC: 13.6 G/DL
LYMPHOCYTES # BLD AUTO: 1.1 K/UL
LYMPHOCYTES NFR BLD: 16.9 %
MAGNESIUM SERPL-MCNC: 2.1 MG/DL
MCH RBC QN AUTO: 30.4 PG
MCHC RBC AUTO-ENTMCNC: 33.7 G/DL
MCV RBC AUTO: 90 FL
MONOCYTES # BLD AUTO: 1.1 K/UL
MONOCYTES NFR BLD: 16.6 %
NEUTROPHILS # BLD AUTO: 4.2 K/UL
NEUTROPHILS NFR BLD: 62.5 %
PHOSPHATE SERPL-MCNC: 3.1 MG/DL
PLATELET # BLD AUTO: 135 K/UL
PMV BLD AUTO: 9.5 FL
POTASSIUM SERPL-SCNC: 3.6 MMOL/L
PROT SERPL-MCNC: 6.9 G/DL
RBC # BLD AUTO: 4.47 M/UL
SODIUM SERPL-SCNC: 141 MMOL/L
WBC # BLD AUTO: 6.63 K/UL

## 2019-01-11 PROCEDURE — 94760 N-INVAS EAR/PLS OXIMETRY 1: CPT

## 2019-01-11 PROCEDURE — 99900035 HC TECH TIME PER 15 MIN (STAT)

## 2019-01-11 PROCEDURE — 36000710: Performed by: SURGERY

## 2019-01-11 PROCEDURE — 63600175 PHARM REV CODE 636 W HCPCS: Performed by: NURSE PRACTITIONER

## 2019-01-11 PROCEDURE — 47562 PR LAP,CHOLECYSTECTOMY: ICD-10-PCS | Mod: ,,, | Performed by: SURGERY

## 2019-01-11 PROCEDURE — 94799 UNLISTED PULMONARY SVC/PX: CPT

## 2019-01-11 PROCEDURE — 25000003 PHARM REV CODE 250: Performed by: NURSE PRACTITIONER

## 2019-01-11 PROCEDURE — 63600175 PHARM REV CODE 636 W HCPCS: Performed by: HOSPITALIST

## 2019-01-11 PROCEDURE — 83735 ASSAY OF MAGNESIUM: CPT

## 2019-01-11 PROCEDURE — 11000001 HC ACUTE MED/SURG PRIVATE ROOM

## 2019-01-11 PROCEDURE — 25000003 PHARM REV CODE 250: Performed by: NURSE ANESTHETIST, CERTIFIED REGISTERED

## 2019-01-11 PROCEDURE — 80053 COMPREHEN METABOLIC PANEL: CPT

## 2019-01-11 PROCEDURE — 71000033 HC RECOVERY, INTIAL HOUR: Performed by: SURGERY

## 2019-01-11 PROCEDURE — 85025 COMPLETE CBC W/AUTO DIFF WBC: CPT

## 2019-01-11 PROCEDURE — 63600175 PHARM REV CODE 636 W HCPCS: Performed by: SURGERY

## 2019-01-11 PROCEDURE — 88304 TISSUE EXAM BY PATHOLOGIST: CPT | Mod: 26,,, | Performed by: PATHOLOGY

## 2019-01-11 PROCEDURE — C9113 INJ PANTOPRAZOLE SODIUM, VIA: HCPCS | Performed by: HOSPITALIST

## 2019-01-11 PROCEDURE — 27201423 OPTIME MED/SURG SUP & DEVICES STERILE SUPPLY: Performed by: SURGERY

## 2019-01-11 PROCEDURE — 37000009 HC ANESTHESIA EA ADD 15 MINS: Performed by: SURGERY

## 2019-01-11 PROCEDURE — S0020 INJECTION, BUPIVICAINE HYDRO: HCPCS | Performed by: SURGERY

## 2019-01-11 PROCEDURE — 88304 TISSUE SPECIMEN TO PATHOLOGY - SURGERY: ICD-10-PCS | Mod: 26,,, | Performed by: PATHOLOGY

## 2019-01-11 PROCEDURE — 71000039 HC RECOVERY, EACH ADD'L HOUR: Performed by: SURGERY

## 2019-01-11 PROCEDURE — 63600175 PHARM REV CODE 636 W HCPCS: Performed by: ANESTHESIOLOGY

## 2019-01-11 PROCEDURE — 47562 LAPAROSCOPIC CHOLECYSTECTOMY: CPT | Mod: ,,, | Performed by: SURGERY

## 2019-01-11 PROCEDURE — 88304 TISSUE EXAM BY PATHOLOGIST: CPT | Performed by: PATHOLOGY

## 2019-01-11 PROCEDURE — 37000008 HC ANESTHESIA 1ST 15 MINUTES: Performed by: SURGERY

## 2019-01-11 PROCEDURE — 63600175 PHARM REV CODE 636 W HCPCS: Performed by: NURSE ANESTHETIST, CERTIFIED REGISTERED

## 2019-01-11 PROCEDURE — S4991 NICOTINE PATCH NONLEGEND: HCPCS | Performed by: NURSE PRACTITIONER

## 2019-01-11 PROCEDURE — 36415 COLL VENOUS BLD VENIPUNCTURE: CPT

## 2019-01-11 PROCEDURE — 84100 ASSAY OF PHOSPHORUS: CPT

## 2019-01-11 PROCEDURE — 36000711: Performed by: SURGERY

## 2019-01-11 PROCEDURE — 25000003 PHARM REV CODE 250: Performed by: SURGERY

## 2019-01-11 RX ORDER — ONDANSETRON 2 MG/ML
4 INJECTION INTRAMUSCULAR; INTRAVENOUS DAILY PRN
Status: DISCONTINUED | OUTPATIENT
Start: 2019-01-11 | End: 2019-01-12

## 2019-01-11 RX ORDER — NEOSTIGMINE METHYLSULFATE 1 MG/ML
INJECTION, SOLUTION INTRAVENOUS
Status: DISCONTINUED | OUTPATIENT
Start: 2019-01-11 | End: 2019-01-11

## 2019-01-11 RX ORDER — SODIUM CHLORIDE 0.9 % (FLUSH) 0.9 %
3 SYRINGE (ML) INJECTION
Status: DISCONTINUED | OUTPATIENT
Start: 2019-01-11 | End: 2019-01-12

## 2019-01-11 RX ORDER — BUPIVACAINE HYDROCHLORIDE 5 MG/ML
INJECTION, SOLUTION EPIDURAL; INTRACAUDAL
Status: DISCONTINUED | OUTPATIENT
Start: 2019-01-11 | End: 2019-01-11 | Stop reason: HOSPADM

## 2019-01-11 RX ORDER — CHLORHEXIDINE GLUCONATE ORAL RINSE 1.2 MG/ML
10 SOLUTION DENTAL 2 TIMES DAILY
Status: DISCONTINUED | OUTPATIENT
Start: 2019-01-11 | End: 2019-01-12 | Stop reason: HOSPADM

## 2019-01-11 RX ORDER — ACETAMINOPHEN 10 MG/ML
INJECTION, SOLUTION INTRAVENOUS
Status: DISCONTINUED | OUTPATIENT
Start: 2019-01-11 | End: 2019-01-11

## 2019-01-11 RX ORDER — SODIUM CHLORIDE, SODIUM LACTATE, POTASSIUM CHLORIDE, CALCIUM CHLORIDE 600; 310; 30; 20 MG/100ML; MG/100ML; MG/100ML; MG/100ML
INJECTION, SOLUTION INTRAVENOUS CONTINUOUS PRN
Status: DISCONTINUED | OUTPATIENT
Start: 2019-01-11 | End: 2019-01-11

## 2019-01-11 RX ORDER — MORPHINE SULFATE 4 MG/ML
2 INJECTION, SOLUTION INTRAMUSCULAR; INTRAVENOUS EVERY 5 MIN PRN
Status: DISCONTINUED | OUTPATIENT
Start: 2019-01-11 | End: 2019-01-12

## 2019-01-11 RX ORDER — LIDOCAINE HYDROCHLORIDE 10 MG/ML
1 INJECTION, SOLUTION EPIDURAL; INFILTRATION; INTRACAUDAL; PERINEURAL ONCE
Status: DISCONTINUED | OUTPATIENT
Start: 2019-01-11 | End: 2019-01-12 | Stop reason: HOSPADM

## 2019-01-11 RX ORDER — ROCURONIUM BROMIDE 10 MG/ML
INJECTION, SOLUTION INTRAVENOUS
Status: DISCONTINUED | OUTPATIENT
Start: 2019-01-11 | End: 2019-01-11

## 2019-01-11 RX ORDER — RAMELTEON 8 MG/1
8 TABLET ORAL NIGHTLY PRN
Status: DISCONTINUED | OUTPATIENT
Start: 2019-01-11 | End: 2019-01-12 | Stop reason: HOSPADM

## 2019-01-11 RX ORDER — PROPOFOL 10 MG/ML
VIAL (ML) INTRAVENOUS
Status: DISCONTINUED | OUTPATIENT
Start: 2019-01-11 | End: 2019-01-11

## 2019-01-11 RX ORDER — SODIUM CHLORIDE, SODIUM LACTATE, POTASSIUM CHLORIDE, CALCIUM CHLORIDE 600; 310; 30; 20 MG/100ML; MG/100ML; MG/100ML; MG/100ML
INJECTION, SOLUTION INTRAVENOUS CONTINUOUS
Status: DISCONTINUED | OUTPATIENT
Start: 2019-01-11 | End: 2019-01-12 | Stop reason: HOSPADM

## 2019-01-11 RX ORDER — HYDROCODONE BITARTRATE AND ACETAMINOPHEN 5; 325 MG/1; MG/1
1 TABLET ORAL EVERY 4 HOURS PRN
Status: DISCONTINUED | OUTPATIENT
Start: 2019-01-11 | End: 2019-01-12 | Stop reason: HOSPADM

## 2019-01-11 RX ORDER — LABETALOL HYDROCHLORIDE 5 MG/ML
INJECTION, SOLUTION INTRAVENOUS
Status: DISCONTINUED | OUTPATIENT
Start: 2019-01-11 | End: 2019-01-11

## 2019-01-11 RX ORDER — AMOXICILLIN 250 MG
1 CAPSULE ORAL 2 TIMES DAILY
Status: DISCONTINUED | OUTPATIENT
Start: 2019-01-11 | End: 2019-01-12 | Stop reason: HOSPADM

## 2019-01-11 RX ORDER — LACTULOSE 10 G/15ML
20 SOLUTION ORAL EVERY 6 HOURS PRN
Status: DISCONTINUED | OUTPATIENT
Start: 2019-01-11 | End: 2019-01-12 | Stop reason: HOSPADM

## 2019-01-11 RX ORDER — OXYCODONE AND ACETAMINOPHEN 5; 325 MG/1; MG/1
1 TABLET ORAL
Status: DISCONTINUED | OUTPATIENT
Start: 2019-01-11 | End: 2019-01-12

## 2019-01-11 RX ORDER — HYDROMORPHONE HCL 2 MG/ML
0.5 VIAL (ML) INJECTION EVERY 10 MIN PRN
Status: DISCONTINUED | OUTPATIENT
Start: 2019-01-11 | End: 2019-01-12 | Stop reason: HOSPADM

## 2019-01-11 RX ORDER — KETOROLAC TROMETHAMINE 30 MG/ML
INJECTION, SOLUTION INTRAMUSCULAR; INTRAVENOUS
Status: DISCONTINUED | OUTPATIENT
Start: 2019-01-11 | End: 2019-01-11

## 2019-01-11 RX ORDER — CEFAZOLIN SODIUM 2 G/50ML
2 SOLUTION INTRAVENOUS
Status: COMPLETED | OUTPATIENT
Start: 2019-01-11 | End: 2019-01-11

## 2019-01-11 RX ORDER — MEPERIDINE HYDROCHLORIDE 50 MG/ML
12.5 INJECTION INTRAMUSCULAR; INTRAVENOUS; SUBCUTANEOUS ONCE AS NEEDED
Status: COMPLETED | OUTPATIENT
Start: 2019-01-11 | End: 2019-01-11

## 2019-01-11 RX ORDER — CEFAZOLIN SODIUM 2 G/50ML
2 SOLUTION INTRAVENOUS
Status: COMPLETED | OUTPATIENT
Start: 2019-01-12 | End: 2019-01-12

## 2019-01-11 RX ORDER — GLYCOPYRROLATE 0.2 MG/ML
INJECTION INTRAMUSCULAR; INTRAVENOUS
Status: DISCONTINUED | OUTPATIENT
Start: 2019-01-11 | End: 2019-01-11

## 2019-01-11 RX ORDER — DIPHENHYDRAMINE HYDROCHLORIDE 50 MG/ML
25 INJECTION INTRAMUSCULAR; INTRAVENOUS EVERY 4 HOURS PRN
Status: DISCONTINUED | OUTPATIENT
Start: 2019-01-11 | End: 2019-01-11 | Stop reason: RX

## 2019-01-11 RX ORDER — INDOCYANINE GREEN AND WATER 25 MG
2.5 KIT INJECTION
Status: DISCONTINUED | OUTPATIENT
Start: 2019-01-11 | End: 2019-01-12

## 2019-01-11 RX ORDER — ONDANSETRON 2 MG/ML
INJECTION INTRAMUSCULAR; INTRAVENOUS
Status: DISCONTINUED | OUTPATIENT
Start: 2019-01-11 | End: 2019-01-11

## 2019-01-11 RX ORDER — BISACODYL 10 MG
10 SUPPOSITORY, RECTAL RECTAL DAILY PRN
Status: DISCONTINUED | OUTPATIENT
Start: 2019-01-11 | End: 2019-01-12 | Stop reason: HOSPADM

## 2019-01-11 RX ORDER — CLONIDINE HYDROCHLORIDE 0.1 MG/1
0.1 TABLET ORAL EVERY 6 HOURS PRN
Status: DISCONTINUED | OUTPATIENT
Start: 2019-01-11 | End: 2019-01-12 | Stop reason: HOSPADM

## 2019-01-11 RX ORDER — HYDROMORPHONE HYDROCHLORIDE 1 MG/ML
1 INJECTION, SOLUTION INTRAMUSCULAR; INTRAVENOUS; SUBCUTANEOUS
Status: DISCONTINUED | OUTPATIENT
Start: 2019-01-11 | End: 2019-01-12 | Stop reason: HOSPADM

## 2019-01-11 RX ORDER — SODIUM CHLORIDE 9 MG/ML
INJECTION, SOLUTION INTRAVENOUS CONTINUOUS
Status: DISCONTINUED | OUTPATIENT
Start: 2019-01-11 | End: 2019-01-12

## 2019-01-11 RX ORDER — LIDOCAINE HYDROCHLORIDE 10 MG/ML
INJECTION INFILTRATION; PERINEURAL
Status: DISCONTINUED | OUTPATIENT
Start: 2019-01-11 | End: 2019-01-11

## 2019-01-11 RX ORDER — FENTANYL CITRATE 50 UG/ML
INJECTION, SOLUTION INTRAMUSCULAR; INTRAVENOUS
Status: DISCONTINUED | OUTPATIENT
Start: 2019-01-11 | End: 2019-01-11

## 2019-01-11 RX ORDER — ONDANSETRON 8 MG/1
8 TABLET, ORALLY DISINTEGRATING ORAL EVERY 8 HOURS PRN
Status: DISCONTINUED | OUTPATIENT
Start: 2019-01-11 | End: 2019-01-12 | Stop reason: HOSPADM

## 2019-01-11 RX ORDER — ACETAMINOPHEN 325 MG/1
650 TABLET ORAL EVERY 6 HOURS PRN
Status: DISCONTINUED | OUTPATIENT
Start: 2019-01-11 | End: 2019-01-12 | Stop reason: HOSPADM

## 2019-01-11 RX ADMIN — KETOROLAC TROMETHAMINE 30 MG: 30 INJECTION, SOLUTION INTRAMUSCULAR; INTRAVENOUS at 06:01

## 2019-01-11 RX ADMIN — PIPERACILLIN AND TAZOBACTAM 4.5 G: 4; .5 INJECTION, POWDER, LYOPHILIZED, FOR SOLUTION INTRAVENOUS; PARENTERAL at 09:01

## 2019-01-11 RX ADMIN — STANDARDIZED SENNA CONCENTRATE AND DOCUSATE SODIUM 1 TABLET: 8.6; 5 TABLET, FILM COATED ORAL at 08:01

## 2019-01-11 RX ADMIN — MORPHINE SULFATE 2 MG: 4 INJECTION INTRAVENOUS at 06:01

## 2019-01-11 RX ADMIN — CHLORHEXIDINE GLUCONATE 10 ML: 1.2 RINSE ORAL at 08:01

## 2019-01-11 RX ADMIN — SODIUM CHLORIDE, SODIUM LACTATE, POTASSIUM CHLORIDE, AND CALCIUM CHLORIDE: 600; 310; 30; 20 INJECTION, SOLUTION INTRAVENOUS at 04:01

## 2019-01-11 RX ADMIN — Medication 1 PATCH: at 09:01

## 2019-01-11 RX ADMIN — PIPERACILLIN AND TAZOBACTAM 4.5 G: 4; .5 INJECTION, POWDER, LYOPHILIZED, FOR SOLUTION INTRAVENOUS; PARENTERAL at 04:01

## 2019-01-11 RX ADMIN — NEOSTIGMINE METHYLSULFATE 5 MG: 1 INJECTION INTRAVENOUS at 06:01

## 2019-01-11 RX ADMIN — HYDROMORPHONE HYDROCHLORIDE 0.5 MG: 2 INJECTION INTRAMUSCULAR; INTRAVENOUS; SUBCUTANEOUS at 07:01

## 2019-01-11 RX ADMIN — CEFAZOLIN SODIUM 2 G: 2 SOLUTION INTRAVENOUS at 05:01

## 2019-01-11 RX ADMIN — FENTANYL CITRATE 50 MCG: 50 INJECTION, SOLUTION INTRAMUSCULAR; INTRAVENOUS at 06:01

## 2019-01-11 RX ADMIN — LIDOCAINE HYDROCHLORIDE 50 MG: 10 INJECTION, SOLUTION INFILTRATION; PERINEURAL at 04:01

## 2019-01-11 RX ADMIN — ENOXAPARIN SODIUM 40 MG: 100 INJECTION SUBCUTANEOUS at 08:01

## 2019-01-11 RX ADMIN — MEPERIDINE HYDROCHLORIDE 12.5 MG: 50 INJECTION, SOLUTION INTRAMUSCULAR; INTRAVENOUS; SUBCUTANEOUS at 06:01

## 2019-01-11 RX ADMIN — FENTANYL CITRATE 100 MCG: 50 INJECTION, SOLUTION INTRAMUSCULAR; INTRAVENOUS at 04:01

## 2019-01-11 RX ADMIN — SODIUM CHLORIDE, SODIUM LACTATE, POTASSIUM CHLORIDE, AND CALCIUM CHLORIDE: .6; .31; .03; .02 INJECTION, SOLUTION INTRAVENOUS at 08:01

## 2019-01-11 RX ADMIN — ROCURONIUM BROMIDE 20 MG: 10 INJECTION, SOLUTION INTRAVENOUS at 05:01

## 2019-01-11 RX ADMIN — LABETALOL HYDROCHLORIDE 10 MG: 5 INJECTION, SOLUTION INTRAVENOUS at 06:01

## 2019-01-11 RX ADMIN — PANTOPRAZOLE SODIUM 40 MG: 40 INJECTION, POWDER, LYOPHILIZED, FOR SOLUTION INTRAVENOUS at 09:01

## 2019-01-11 RX ADMIN — HYDROMORPHONE HYDROCHLORIDE 1 MG: 1 INJECTION, SOLUTION INTRAMUSCULAR; INTRAVENOUS; SUBCUTANEOUS at 10:01

## 2019-01-11 RX ADMIN — ROCURONIUM BROMIDE 50 MG: 10 INJECTION, SOLUTION INTRAVENOUS at 04:01

## 2019-01-11 RX ADMIN — SODIUM CHLORIDE: 0.9 INJECTION, SOLUTION INTRAVENOUS at 02:01

## 2019-01-11 RX ADMIN — PROPOFOL 200 MG: 10 INJECTION, EMULSION INTRAVENOUS at 04:01

## 2019-01-11 RX ADMIN — ROBINUL 0.8 MG: 0.2 INJECTION INTRAMUSCULAR; INTRAVENOUS at 06:01

## 2019-01-11 RX ADMIN — ONDANSETRON 4 MG: 2 INJECTION, SOLUTION INTRAMUSCULAR; INTRAVENOUS at 06:01

## 2019-01-11 RX ADMIN — ACETAMINOPHEN 1000 MG: 10 INJECTION, SOLUTION INTRAVENOUS at 06:01

## 2019-01-11 RX ADMIN — MORPHINE SULFATE 4 MG: 10 INJECTION, SOLUTION INTRAMUSCULAR; INTRAVENOUS at 01:01

## 2019-01-11 NOTE — ASSESSMENT & PLAN NOTE
-IV antibiotics continued   -General Surgery following   -HIDA scan completed with results reviewed   -analgesia prn   -antiemetics prn   -surgical procedure planned for today

## 2019-01-11 NOTE — PLAN OF CARE
Problem: Adult Inpatient Plan of Care  Goal: Plan of Care Review  Outcome: Ongoing (interventions implemented as appropriate)  Remains free from harm, unable to give narcotics d/t HIDA scan in AM, pain controlled through rest, will continue to monitor. 24 hour chart check complete.

## 2019-01-11 NOTE — ANESTHESIA PREPROCEDURE EVALUATION
01/11/2019  Des Dash is a 58 y.o., male.    Anesthesia Evaluation    I have reviewed the Patient Summary Reports.    I have reviewed the Nursing Notes.   I have reviewed the Medications.     Review of Systems  Anesthesia Hx:  No problems with previous Anesthesia  Neg history of prior surgery. Denies Family Hx of Anesthesia complications.   Denies Personal Hx of Anesthesia complications.   Social:  Smoker    Cardiovascular:  Cardiovascular Normal     Pulmonary:   Sleep Apnea    Renal/:  Renal/ Normal     Hepatic/GI:   Pacreatitis   Musculoskeletal:  Musculoskeletal Normal    Neurological:  Neurology Normal    Endocrine:  Endocrine Normal    Psych:  Psychiatric Normal           Physical Exam  General:  Well nourished    Airway/Jaw/Neck:  Airway Findings: Mouth Opening: Normal Tongue: Normal  General Airway Assessment: Adult  Mallampati: II  TM Distance: Normal, at least 6 cm          Chest/Lungs:  Chest/Lungs Findings: Normal Respiratory Rate     Heart/Vascular:  Heart Findings: Rate: Normal             Anesthesia Plan  Type of Anesthesia, risks & benefits discussed:  Anesthesia Type:  general  Patient's Preference:   Intra-op Monitoring Plan: standard ASA monitors  Intra-op Monitoring Plan Comments:   Post Op Pain Control Plan:   Post Op Pain Control Plan Comments:   Induction:   IV  Beta Blocker:  Patient is not currently on a Beta-Blocker (No further documentation required).       Informed Consent: Patient understands risks and agrees with Anesthesia plan.  Questions answered. Anesthesia consent signed with patient.  ASA Score: 2     Day of Surgery Review of History & Physical:    H&P update referred to the surgeon.         Ready For Surgery From Anesthesia Perspective.

## 2019-01-11 NOTE — SUBJECTIVE & OBJECTIVE
Interval History: pt stable today with mild abdominal pain noted.  Pt febrile overnight.  Blood cultures with gram negative rods (E. Coli) noted.  Current IV antibiotics regime continued.  HIDA scan results showed biliary dyskinesia and cystic and common surendra ducts are patent with decreased gallbladder ejection fraction of 14% at 40 min with findings suggesting chronic cholecystitis.  Surgical procedure planned for today.      Review of Systems   Constitutional: Positive for chills and fever. Negative for activity change, appetite change, diaphoresis and fatigue.   HENT: Negative for facial swelling, sore throat, tinnitus and trouble swallowing.    Eyes: Negative for photophobia and visual disturbance.   Respiratory: Negative for apnea, cough, chest tightness, shortness of breath and wheezing.    Cardiovascular: Negative for chest pain, palpitations and leg swelling.   Gastrointestinal: Positive for abdominal distention, abdominal pain and nausea. Negative for constipation, diarrhea and vomiting.   Endocrine: Negative for polydipsia, polyphagia and polyuria.   Genitourinary: Negative for decreased urine volume, dysuria, flank pain, frequency and hematuria.   Musculoskeletal: Positive for back pain and neck pain. Negative for arthralgias, joint swelling, myalgias and neck stiffness.   Skin: Negative for pallor and rash.   Allergic/Immunologic: Negative for immunocompromised state.   Neurological: Negative for dizziness, seizures, syncope, weakness, numbness and headaches.   Psychiatric/Behavioral: Negative for confusion, hallucinations and suicidal ideas. The patient is not nervous/anxious.    All other systems reviewed and are negative.    Objective:     Vital Signs (Most Recent):  Temp: 98.9 °F (37.2 °C) (01/11/19 0801)  Pulse: 62 (01/11/19 0801)  Resp: 18 (01/11/19 0801)  BP: (!) 141/82 (01/11/19 0801)  SpO2: 98 % (01/11/19 0801) Vital Signs (24h Range):  Temp:  [98.4 °F (36.9 °C)-102.3 °F (39.1 °C)] 98.9 °F  (37.2 °C)  Pulse:  [62-81] 62  Resp:  [18] 18  SpO2:  [95 %-100 %] 98 %  BP: (114-152)/(66-82) 141/82     Weight: 109.1 kg (240 lb 10.1 oz)  Body mass index is 32.64 kg/m².    Intake/Output Summary (Last 24 hours) at 1/11/2019 1621  Last data filed at 1/11/2019 1200  Gross per 24 hour   Intake 3200 ml   Output 1400 ml   Net 1800 ml      Physical Exam   Constitutional: He is oriented to person, place, and time. He appears well-developed and well-nourished. No distress.   HENT:   Head: Normocephalic and atraumatic.   Mouth/Throat: Oropharynx is clear and moist.   Eyes: Conjunctivae are normal. Pupils are equal, round, and reactive to light. No scleral icterus.   Neck: Normal range of motion. Neck supple. No JVD present. No thyromegaly present.   Cardiovascular: Normal rate and regular rhythm. Exam reveals no gallop and no friction rub.   No murmur heard.  Pulmonary/Chest: Effort normal and breath sounds normal. No respiratory distress. He has no wheezes. He has no rales.   Abdominal: Soft. Bowel sounds are normal. He exhibits no distension. There is tenderness (mild ). There is no guarding.   Genitourinary:   Genitourinary Comments: Deferred   Musculoskeletal: Normal range of motion.   Neurological: He is alert and oriented to person, place, and time. No cranial nerve deficit.   Skin: Skin is warm. Capillary refill takes less than 2 seconds. He is not diaphoretic. No erythema.   Psychiatric: He has a normal mood and affect.   Nursing note and vitals reviewed.      Significant Labs:   CBC:   Recent Labs   Lab 01/10/19  0459 01/11/19  0540   WBC 6.18 6.63   HGB 12.6* 13.6*   HCT 38.7* 40.3   * 135*     CMP:   Recent Labs   Lab 01/10/19  0459 01/11/19  0540    141   K 3.7 3.6    105   CO2 19* 23   GLU 84 87   BUN 8 8   CREATININE 0.9 1.0   CALCIUM 8.7 9.5   PROT 6.2 6.9   ALBUMIN 3.0* 3.2*   BILITOT 1.0 1.5*   ALKPHOS 84 99   AST 30 31   ALT 44 38   ANIONGAP 12 13   EGFRNONAA >60 >60       Significant  Imaging:   Imaging Results          US Abdomen Limited (Gallbladder) (Final result)  Result time 01/07/19 23:50:53    Final result by Bob Maldonado MD (01/07/19 23:50:53)                 Impression:      There is some mild wall thickening of the gallbladder but no stones are demonstrated.  The Swain sign is negative..      Electronically signed by: Remigio Maldonado MD  Date:    01/07/2019  Time:    23:50             Narrative:    EXAMINATION:  US ABDOMEN LIMITED    CLINICAL HISTORY:  Epigastric pain    TECHNIQUE:  Limited ultrasound of the right upper quadrant of the abdomen (including pancreas, liver, gallbladder, common bile duct, and right kidney) was performed.    COMPARISON:  01/07/2019 CT scan    FINDINGS:  Liver: Normal in size. The liver demonstrates homogenous echotexture. There is a 1.11 x 1.31 x 1.32 cm cyst in the inferior portion right lobe of liver which matches the findings on ultrasound.    Biliary system: The gallbladder demonstrates no evidence of calculi. The gallbladder wall appears mildly thickened measuring 0.26 cm.  No gallstones are identified.  Swain sign is negative.  The common duct is not dilated, measuring 0.55 cm. No intrahepatic ductal dilatation.    Pancreas: Much of the pancreas is obscured by overlying bowel gas.    Right kidney: Normal in size measuring 10.10 cmwith no hydronephrosis, mass, or calculi.    Vascular: The portions of the aorta, vena cava, and portal vein appear free of acute abnormality.                               CT Abdomen Pelvis With Contrast (Final result)  Result time 01/07/19 23:28:46    Final result by Bob Maldonado MD (01/07/19 23:28:46)                 Impression:      The findings are suspicious for cholecystitis with inflamed appearing gallbladder.  If clinically indicated, ultrasound may prove helpful in the further evaluation of this patient.    All CT scans at (this location) are performed using dose modulation techniques as appropriate to  a performed exam including the following:  automated exposure control; adjustment of the mA and /or kV according to patient size (this includes techniques or standardized protocols for targeted exams where dose is matched to indication/reason for exam: i.e. extremities or head); use of iterative reconstruction technique.      Electronically signed by: Remigio Maldonado MD  Date:    01/07/2019  Time:    23:28             Narrative:    EXAMINATION:  CT ABDOMEN PELVIS WITH CONTRAST    CLINICAL HISTORY:  pancreatitis, elevated LFTs;    TECHNIQUE:  Low dose axial images, sagittal and coronal reformations were obtained from the lung bases to the pubic symphysis following the IV administration of 75 mL of Omnipaque 350 .    COMPARISON:  None.    FINDINGS:  ABDOMEN    Lung bases: Unremarkable    Liver/gallbladder/biliary: The liver demonstrates no suspicious findings.  Incidental note is made of a small cyst in the right lobe.  It measures 1.9 x 1.5 cm on the inferior aspect of the right lobe of liver..  The gallbladder is normal in size.  There is streaky increased attenuation of the gallbladder wall raising concern for changes of cholecystitis with some increased markings in the adjacent fat.  The common bile duct appears within normal limits.No biliary ductal dilation.    Pancreas: The pancreas is unremarkable in appearance.    Spleen: The spleen is not enlarged.    Adrenals: Unremarkable    Kidneys: The kidneys are equally perfused and demonstrate no solid masses.    Bowel/Mesentery: There is no evidence of bowel obstruction. There is diverticulosis of the colon.  The appendix is normal in appearance.  No mesenteric stranding or adenopathy.    Retroperitoneum: No adenopathy.The aorta demonstrates a normal caliber.    PELVIS:    Genitourinary/Reproductive organs: Unremarkable    Adenopathy: None    Free Fluid: No free fluid    Osseus Structures/Soft tissues: No suspicious appearing osseus lesions.  Fat filled inguinal  hernias are present bilaterally.                               X-Ray Chest PA And Lateral (Final result)  Result time 01/07/19 22:18:20    Final result by SHIRLEY Corcoran Sr., MD (01/07/19 22:18:20)                 Impression:      1. The lungs are clear.  2. There is mild cardiomegaly.  .      Electronically signed by: Quentin Corcoran MD  Date:    01/07/2019  Time:    22:18             Narrative:    EXAMINATION:  XR CHEST PA AND LATERAL    CLINICAL HISTORY:  Chest Pain;    COMPARISON:  None    FINDINGS:  There is mild cardiomegaly.  The lungs are clear. There is no pneumothorax or pleural effusion.

## 2019-01-11 NOTE — ASSESSMENT & PLAN NOTE
- noted on imaging   - General Surgery consulted   -HIDA scan showed the cystic and common surendra ducts patent with decreased gallbladder ejection fraction suggesting chronic cholecystitis versus biliary dyskinesia.  -analgesia prn   -antiemetics prn   -pt NPO with surgical intervention planned for today

## 2019-01-11 NOTE — ASSESSMENT & PLAN NOTE
- in the setting of pancreatitis/cholecystitis   - normalized on 1/9/19  - hold statin  - CPK and hep panel negative

## 2019-01-11 NOTE — ASSESSMENT & PLAN NOTE
- improved   - likely related passage of gallstones  - p/w epigastric pain, n/v  - CT/US shows suspected cholecystitis  - lipase 812>>296>>122  - amylase and LFTs normalized on 1/9/19  - diagnosis based on sx and labs  - aggressive IV hydration with 200cc/hr of NS  - morphine prn pain  - zofran/promethazine prn nausea  - diet advanced to clear liquids- pt did not tolerate on 1/8/19  - GI ppx with protonix  - lipid panel, UDS, ethanol level to eval for etiology  - case discussed with GI with no further recommendations   - General Surgery consulted

## 2019-01-11 NOTE — PLAN OF CARE
01/11/19 1343   Medicare Message   Important Message from Medicare regarding Discharge Appeal Rights Given to patient/caregiver;Explained to patient/caregiver;Signed/date by patient/caregiver   Date IMM was signed 01/11/19   Time IMM was signed [id number]

## 2019-01-11 NOTE — PROGRESS NOTES
[facility] Hospital Medicine  Progress Note    Patient Name: Des Dash  MRN: [id number]  Patient Class: IP- Inpatient   Admission Date: 1/7/2019  Length of Stay: 3 days  Attending Physician: Manuel Darby MD  Primary Care Provider: Juan José Batres MD        Subjective:     Principal Problem:Acute pancreatitis    HPI:  58M h/o HA presents with epigastric pain x 1 day.  Onset sudden, burning characteristic.  Associated with N/V x 2.   Also c/o HA.  Denies fever, chills, diarrhea, constipation, hematochezia, melena, dysuria, hematuria, frequency, cough, congestion, and all other sxs at this time.   Denies ETOH use.  Current smoker.  In ER,  Lipase >1000.  CT/US negative for cholecystitis and pancreatitis.  Patient given 1L NS bolus.         Hospital Course:  Pt admitted to Medical Surgical Unit for acute pancreatitis.  CT of abdomen showed findings suspicious for cholecystitis with inflamed appearing gallbladder.  US showed mild wall thickening of the gallbladder with no stones.  Elevated amylase, lipase, and LFTs noted.  Pt treated with IV hydration, bowel rest, analgesia prn, and antiemetics prn.  Lipase and LFTs trending down.  Pt verbalized symptom improvement with N/V and abdominal pain denied.  Pt smokes 1 ppd x 42 years and counseled on smoking cessation with understanding verified.  Diet advanced and pt unable to tolerate.  NPO status resumed with abdominal complaints continued.  Case discussed with GI with no further recommendations.  General Surgery consulted for further evaluation. Amylase and LFTs normalized with Lipase trending down.  Pt noted to be febrile with blood cultures + for gram negative rods.  IV antibiotics initiated and ID consulted.  HIDA scan obtained with results showing cystic and common surendra ducts are patent with decreased gallbladder ejection fraction of 14% at 40 min with findings suggesting chronic cholecystitis versus biliary dyskinesia.  Surgical  procedure planned for today.      Interval History: pt stable today with mild abdominal pain noted.  Pt febrile overnight.  Blood cultures with gram negative rods (E. Coli) noted.  Current IV antibiotics regime continued.  HIDA scan results showed biliary dyskinesia and cystic and common surendra ducts are patent with decreased gallbladder ejection fraction of 14% at 40 min with findings suggesting chronic cholecystitis.  Surgical procedure planned for today.      Review of Systems   Constitutional: Positive for chills and fever. Negative for activity change, appetite change, diaphoresis and fatigue.   HENT: Negative for facial swelling, sore throat, tinnitus and trouble swallowing.    Eyes: Negative for photophobia and visual disturbance.   Respiratory: Negative for apnea, cough, chest tightness, shortness of breath and wheezing.    Cardiovascular: Negative for chest pain, palpitations and leg swelling.   Gastrointestinal: Positive for abdominal distention, abdominal pain and nausea. Negative for constipation, diarrhea and vomiting.   Endocrine: Negative for polydipsia, polyphagia and polyuria.   Genitourinary: Negative for decreased urine volume, dysuria, flank pain, frequency and hematuria.   Musculoskeletal: Positive for back pain and neck pain. Negative for arthralgias, joint swelling, myalgias and neck stiffness.   Skin: Negative for pallor and rash.   Allergic/Immunologic: Negative for immunocompromised state.   Neurological: Negative for dizziness, seizures, syncope, weakness, numbness and headaches.   Psychiatric/Behavioral: Negative for confusion, hallucinations and suicidal ideas. The patient is not nervous/anxious.    All other systems reviewed and are negative.    Objective:     Vital Signs (Most Recent):  Temp: 98.9 °F (37.2 °C) (01/11/19 0801)  Pulse: 62 (01/11/19 0801)  Resp: 18 (01/11/19 0801)  BP: (!) 141/82 (01/11/19 0801)  SpO2: 98 % (01/11/19 0801) Vital Signs (24h Range):  Temp:  [98.4 °F (36.9  °C)-102.3 °F (39.1 °C)] 98.9 °F (37.2 °C)  Pulse:  [62-81] 62  Resp:  [18] 18  SpO2:  [95 %-100 %] 98 %  BP: (114-152)/(66-82) 141/82     Weight: 109.1 kg (240 lb 10.1 oz)  Body mass index is 32.64 kg/m².    Intake/Output Summary (Last 24 hours) at 1/11/2019 1621  Last data filed at 1/11/2019 1200  Gross per 24 hour   Intake 3200 ml   Output 1400 ml   Net 1800 ml      Physical Exam   Constitutional: He is oriented to person, place, and time. He appears well-developed and well-nourished. No distress.   HENT:   Head: Normocephalic and atraumatic.   Mouth/Throat: Oropharynx is clear and moist.   Eyes: Conjunctivae are normal. Pupils are equal, round, and reactive to light. No scleral icterus.   Neck: Normal range of motion. Neck supple. No JVD present. No thyromegaly present.   Cardiovascular: Normal rate and regular rhythm. Exam reveals no gallop and no friction rub.   No murmur heard.  Pulmonary/Chest: Effort normal and breath sounds normal. No respiratory distress. He has no wheezes. He has no rales.   Abdominal: Soft. Bowel sounds are normal. He exhibits no distension. There is tenderness (mild ). There is no guarding.   Genitourinary:   Genitourinary Comments: Deferred   Musculoskeletal: Normal range of motion.   Neurological: He is alert and oriented to person, place, and time. No cranial nerve deficit.   Skin: Skin is warm. Capillary refill takes less than 2 seconds. He is not diaphoretic. No erythema.   Psychiatric: He has a normal mood and affect.   Nursing note and vitals reviewed.      Significant Labs:   CBC:   Recent Labs   Lab 01/10/19  0459 01/11/19  0540   WBC 6.18 6.63   HGB 12.6* 13.6*   HCT 38.7* 40.3   * 135*     CMP:   Recent Labs   Lab 01/10/19  0459 01/11/19  0540    141   K 3.7 3.6    105   CO2 19* 23   GLU 84 87   BUN 8 8   CREATININE 0.9 1.0   CALCIUM 8.7 9.5   PROT 6.2 6.9   ALBUMIN 3.0* 3.2*   BILITOT 1.0 1.5*   ALKPHOS 84 99   AST 30 31   ALT 44 38   ANIONGAP 12 13    EGFRNONAA >60 >60       Significant Imaging:   Imaging Results          US Abdomen Limited (Gallbladder) (Final result)  Result time 01/07/19 23:50:53    Final result by Bob Maldonado MD (01/07/19 23:50:53)                 Impression:      There is some mild wall thickening of the gallbladder but no stones are demonstrated.  The Swain sign is negative..      Electronically signed by: Remigio Maldonado MD  Date:    01/07/2019  Time:    23:50             Narrative:    EXAMINATION:  US ABDOMEN LIMITED    CLINICAL HISTORY:  Epigastric pain    TECHNIQUE:  Limited ultrasound of the right upper quadrant of the abdomen (including pancreas, liver, gallbladder, common bile duct, and right kidney) was performed.    COMPARISON:  01/07/2019 CT scan    FINDINGS:  Liver: Normal in size. The liver demonstrates homogenous echotexture. There is a 1.11 x 1.31 x 1.32 cm cyst in the inferior portion right lobe of liver which matches the findings on ultrasound.    Biliary system: The gallbladder demonstrates no evidence of calculi. The gallbladder wall appears mildly thickened measuring 0.26 cm.  No gallstones are identified.  Swain sign is negative.  The common duct is not dilated, measuring 0.55 cm. No intrahepatic ductal dilatation.    Pancreas: Much of the pancreas is obscured by overlying bowel gas.    Right kidney: Normal in size measuring 10.10 cmwith no hydronephrosis, mass, or calculi.    Vascular: The portions of the aorta, vena cava, and portal vein appear free of acute abnormality.                               CT Abdomen Pelvis With Contrast (Final result)  Result time 01/07/19 23:28:46    Final result by Bob Maldonado MD (01/07/19 23:28:46)                 Impression:      The findings are suspicious for cholecystitis with inflamed appearing gallbladder.  If clinically indicated, ultrasound may prove helpful in the further evaluation of this patient.    All CT scans at (this location) are performed using dose  modulation techniques as appropriate to a performed exam including the following:  automated exposure control; adjustment of the mA and /or kV according to patient size (this includes techniques or standardized protocols for targeted exams where dose is matched to indication/reason for exam: i.e. extremities or head); use of iterative reconstruction technique.      Electronically signed by: Remigio Maldonado MD  Date:    01/07/2019  Time:    23:28             Narrative:    EXAMINATION:  CT ABDOMEN PELVIS WITH CONTRAST    CLINICAL HISTORY:  pancreatitis, elevated LFTs;    TECHNIQUE:  Low dose axial images, sagittal and coronal reformations were obtained from the lung bases to the pubic symphysis following the IV administration of 75 mL of Omnipaque 350 .    COMPARISON:  None.    FINDINGS:  ABDOMEN    Lung bases: Unremarkable    Liver/gallbladder/biliary: The liver demonstrates no suspicious findings.  Incidental note is made of a small cyst in the right lobe.  It measures 1.9 x 1.5 cm on the inferior aspect of the right lobe of liver..  The gallbladder is normal in size.  There is streaky increased attenuation of the gallbladder wall raising concern for changes of cholecystitis with some increased markings in the adjacent fat.  The common bile duct appears within normal limits.No biliary ductal dilation.    Pancreas: The pancreas is unremarkable in appearance.    Spleen: The spleen is not enlarged.    Adrenals: Unremarkable    Kidneys: The kidneys are equally perfused and demonstrate no solid masses.    Bowel/Mesentery: There is no evidence of bowel obstruction. There is diverticulosis of the colon.  The appendix is normal in appearance.  No mesenteric stranding or adenopathy.    Retroperitoneum: No adenopathy.The aorta demonstrates a normal caliber.    PELVIS:    Genitourinary/Reproductive organs: Unremarkable    Adenopathy: None    Free Fluid: No free fluid    [place] Structures/Soft tissues: No suspicious appearing  osseus lesions.  Fat filled inguinal hernias are present bilaterally.                               X-Ray Chest PA And Lateral (Final result)  Result time 01/07/19 22:18:20    Final result by SHIRLEY Corcoran Sr., MD (01/07/19 22:18:20)                 Impression:      1. The lungs are clear.  2. There is mild cardiomegaly.  .      Electronically signed by: Quentin Corcoran MD  Date:    01/07/2019  Time:    22:18             Narrative:    EXAMINATION:  XR CHEST PA AND LATERAL    CLINICAL HISTORY:  Chest Pain;    COMPARISON:  None    FINDINGS:  There is mild cardiomegaly.  The lungs are clear. There is no pneumothorax or pleural effusion.                              Assessment/Plan:      * Acute pancreatitis    - improved   - likely related passage of gallstones  - p/w epigastric pain, n/v  - CT/US shows suspected cholecystitis  - lipase 812>>296>>122  - amylase and LFTs normalized on 1/9/19  - diagnosis based on sx and labs  - aggressive IV hydration with 200cc/hr of NS  - morphine prn pain  - zofran/promethazine prn nausea  - diet advanced to clear liquids- pt did not tolerate on 1/8/19  - GI ppx with protonix  - lipid panel, UDS, ethanol level to eval for etiology  - case discussed with GI with no further recommendations   - General Surgery consulted        Bacteremia    -blood cultures grew gram negative rods- E. Coli   -ID consulted   -IV antibiotics   -pt to be transitioned to oral and treated for 2 weeks upon discharge        Cholecystitis    -IV antibiotics continued   -General Surgery following   -IV hydration with supportive care continued   -HIDA scan completed with results reviewed   -analgesia prn   -antiemetics prn   -surgical procedure planned for today        Thickening of wall of gallbladder    - noted on imaging   - General Surgery consulted   -HIDA scan showed the cystic and common surendra ducts patent with decreased gallbladder ejection fraction suggesting chronic cholecystitis versus biliary  dyskinesia.  -analgesia prn   -antiemetics prn   -pt NPO with surgical intervention planned for today       SARAH (obstructive sleep apnea)    CPAP nightly        Tobacco abuse    -Nicotine patch  -pt counseled on smoking cessation with understanding verbalized        Transaminitis    - in the setting of pancreatitis/cholecystitis   - normalized on 1/9/19  - hold statin  - CPK and hep panel negative         VTE Risk Mitigation (From admission, onward)        Ordered     enoxaparin injection 40 mg  Daily      01/08/19 0103     IP VTE HIGH RISK PATIENT  Once      01/08/19 0103              Lidia Singleton NP  Department of Hospital Medicine   [facility]

## 2019-01-11 NOTE — ASSESSMENT & PLAN NOTE
-blood cultures grew gram negative rods- E. Coli   -ID consulted   -IV antibiotics   -pt to be transitioned to oral and treated for 2 weeks upon discharge

## 2019-01-11 NOTE — PLAN OF CARE
Problem: Adult Inpatient Plan of Care  Goal: Plan of Care Review  Outcome: Ongoing (interventions implemented as appropriate)  Remains injury free. Pain managed with medication. Patient to surgery for lap beata.

## 2019-01-12 VITALS
RESPIRATION RATE: 20 BRPM | HEART RATE: 61 BPM | HEIGHT: 72 IN | TEMPERATURE: 98 F | DIASTOLIC BLOOD PRESSURE: 74 MMHG | OXYGEN SATURATION: 97 % | BODY MASS INDEX: 31.92 KG/M2 | SYSTOLIC BLOOD PRESSURE: 118 MMHG | WEIGHT: 235.69 LBS

## 2019-01-12 LAB
ALBUMIN SERPL BCP-MCNC: 2.8 G/DL
ALP SERPL-CCNC: 105 U/L
ALT SERPL W/O P-5'-P-CCNC: 48 U/L
ANION GAP SERPL CALC-SCNC: 12 MMOL/L
AST SERPL-CCNC: 72 U/L
BACTERIA BLD CULT: NORMAL
BASOPHILS # BLD AUTO: 0.01 K/UL
BASOPHILS NFR BLD: 0.1 %
BILIRUB SERPL-MCNC: 0.7 MG/DL
BUN SERPL-MCNC: 11 MG/DL
CALCIUM SERPL-MCNC: 8.6 MG/DL
CHLORIDE SERPL-SCNC: 105 MMOL/L
CO2 SERPL-SCNC: 20 MMOL/L
CREAT SERPL-MCNC: 0.9 MG/DL
DIFFERENTIAL METHOD: ABNORMAL
EOSINOPHIL # BLD AUTO: 0.3 K/UL
EOSINOPHIL NFR BLD: 3.9 %
ERYTHROCYTE [DISTWIDTH] IN BLOOD BY AUTOMATED COUNT: 14 %
EST. GFR  (AFRICAN AMERICAN): >60 ML/MIN/1.73 M^2
EST. GFR  (NON AFRICAN AMERICAN): >60 ML/MIN/1.73 M^2
GLUCOSE SERPL-MCNC: 81 MG/DL
HCT VFR BLD AUTO: 35.2 %
HGB BLD-MCNC: 11.9 G/DL
LYMPHOCYTES # BLD AUTO: 1.5 K/UL
LYMPHOCYTES NFR BLD: 20.5 %
MAGNESIUM SERPL-MCNC: 1.8 MG/DL
MCH RBC QN AUTO: 30.4 PG
MCHC RBC AUTO-ENTMCNC: 33.8 G/DL
MCV RBC AUTO: 90 FL
MONOCYTES # BLD AUTO: 0.9 K/UL
MONOCYTES NFR BLD: 12.8 %
NEUTROPHILS # BLD AUTO: 4.5 K/UL
NEUTROPHILS NFR BLD: 62.7 %
PHOSPHATE SERPL-MCNC: 3.6 MG/DL
PLATELET # BLD AUTO: 124 K/UL
PMV BLD AUTO: 8.9 FL
POTASSIUM SERPL-SCNC: 3.3 MMOL/L
PROT SERPL-MCNC: 5.9 G/DL
RBC # BLD AUTO: 3.92 M/UL
SODIUM SERPL-SCNC: 137 MMOL/L
WBC # BLD AUTO: 7.16 K/UL

## 2019-01-12 PROCEDURE — 63600175 PHARM REV CODE 636 W HCPCS: Performed by: SURGERY

## 2019-01-12 PROCEDURE — 85025 COMPLETE CBC W/AUTO DIFF WBC: CPT

## 2019-01-12 PROCEDURE — 63600175 PHARM REV CODE 636 W HCPCS: Performed by: NURSE PRACTITIONER

## 2019-01-12 PROCEDURE — 84100 ASSAY OF PHOSPHORUS: CPT

## 2019-01-12 PROCEDURE — 99024 POSTOP FOLLOW-UP VISIT: CPT | Mod: ,,, | Performed by: PHYSICIAN ASSISTANT

## 2019-01-12 PROCEDURE — 63600175 PHARM REV CODE 636 W HCPCS: Performed by: HOSPITALIST

## 2019-01-12 PROCEDURE — 25000003 PHARM REV CODE 250: Performed by: NURSE PRACTITIONER

## 2019-01-12 PROCEDURE — 80053 COMPREHEN METABOLIC PANEL: CPT

## 2019-01-12 PROCEDURE — 83735 ASSAY OF MAGNESIUM: CPT

## 2019-01-12 PROCEDURE — 87040 BLOOD CULTURE FOR BACTERIA: CPT | Mod: 59

## 2019-01-12 PROCEDURE — 99024 PR POST-OP FOLLOW-UP VISIT: ICD-10-PCS | Mod: ,,, | Performed by: PHYSICIAN ASSISTANT

## 2019-01-12 PROCEDURE — C9113 INJ PANTOPRAZOLE SODIUM, VIA: HCPCS | Performed by: HOSPITALIST

## 2019-01-12 PROCEDURE — S4991 NICOTINE PATCH NONLEGEND: HCPCS | Performed by: NURSE PRACTITIONER

## 2019-01-12 PROCEDURE — 36415 COLL VENOUS BLD VENIPUNCTURE: CPT

## 2019-01-12 PROCEDURE — 25000003 PHARM REV CODE 250: Performed by: SURGERY

## 2019-01-12 PROCEDURE — 94799 UNLISTED PULMONARY SVC/PX: CPT

## 2019-01-12 RX ORDER — POTASSIUM CHLORIDE 20 MEQ/1
60 TABLET, EXTENDED RELEASE ORAL ONCE
Status: COMPLETED | OUTPATIENT
Start: 2019-01-12 | End: 2019-01-12

## 2019-01-12 RX ORDER — LEVOFLOXACIN 750 MG/1
750 TABLET ORAL DAILY
Qty: 14 TABLET | Refills: 0 | Status: SHIPPED | OUTPATIENT
Start: 2019-01-12 | End: 2019-01-26

## 2019-01-12 RX ORDER — HYDROCODONE BITARTRATE AND ACETAMINOPHEN 5; 325 MG/1; MG/1
1 TABLET ORAL EVERY 6 HOURS PRN
Qty: 8 TABLET | Refills: 0 | Status: SHIPPED | OUTPATIENT
Start: 2019-01-12 | End: 2019-03-07

## 2019-01-12 RX ADMIN — CEFAZOLIN SODIUM 2 G: 2 SOLUTION INTRAVENOUS at 09:01

## 2019-01-12 RX ADMIN — PIPERACILLIN AND TAZOBACTAM 4.5 G: 4; .5 INJECTION, POWDER, LYOPHILIZED, FOR SOLUTION INTRAVENOUS; PARENTERAL at 03:01

## 2019-01-12 RX ADMIN — PIPERACILLIN AND TAZOBACTAM 4.5 G: 4; .5 INJECTION, POWDER, LYOPHILIZED, FOR SOLUTION INTRAVENOUS; PARENTERAL at 09:01

## 2019-01-12 RX ADMIN — CHLORHEXIDINE GLUCONATE 10 ML: 1.2 RINSE ORAL at 09:01

## 2019-01-12 RX ADMIN — HYDROCODONE BITARTRATE AND ACETAMINOPHEN 1 TABLET: 5; 325 TABLET ORAL at 09:01

## 2019-01-12 RX ADMIN — LORAZEPAM 0.5 MG: 2 INJECTION INTRAMUSCULAR; INTRAVENOUS at 01:01

## 2019-01-12 RX ADMIN — Medication 1 PATCH: at 09:01

## 2019-01-12 RX ADMIN — POTASSIUM CHLORIDE 60 MEQ: 1500 TABLET, EXTENDED RELEASE ORAL at 12:01

## 2019-01-12 RX ADMIN — CEFAZOLIN SODIUM 2 G: 2 SOLUTION INTRAVENOUS at 01:01

## 2019-01-12 RX ADMIN — PANTOPRAZOLE SODIUM 40 MG: 40 INJECTION, POWDER, LYOPHILIZED, FOR SOLUTION INTRAVENOUS at 09:01

## 2019-01-12 RX ADMIN — STANDARDIZED SENNA CONCENTRATE AND DOCUSATE SODIUM 1 TABLET: 8.6; 5 TABLET, FILM COATED ORAL at 09:01

## 2019-01-12 NOTE — SUBJECTIVE & OBJECTIVE
Interval History: s/p robotic beata - doing well and tolerating liquids.     Medications:  Continuous Infusions:   sodium chloride 0.9% Stopped (01/11/19 1400)    lactated ringers Stopped (01/12/19 0301)     Scheduled Meds:   ceFAZolin (ANCEF) IVPB  2 g Intravenous Q8H    chlorhexidine  10 mL Mouth/Throat BID    enoxaparin  40 mg Subcutaneous Daily    lidocaine (PF) 10 mg/ml (1%)  1 mL Intradermal Once    nicotine  1 patch Transdermal Daily    nozaseptin   Each Nare BID    oxymetazoline  2 spray Each Nare BID    pantoprazole  40 mg Intravenous Daily    piperacillin-tazobactam (ZOSYN) IVPB  4.5 g Intravenous Q8H    senna-docusate 8.6-50 mg  1 tablet Oral BID     PRN Meds:acetaminophen, bisacodyl, butalbital-acetaminophen-caffeine -40 mg, cloNIDine, dextrose 50%, dextrose 50%, glucagon (human recombinant), glucose, glucose, HYDROcodone-acetaminophen, HYDROmorphone, HYDROmorphone, ibuprofen, lactulose, lorazepam, ondansetron, ondansetron, promethazine (PHENERGAN) IVPB, ramelteon, sodium chloride 0.9%     Review of patient's allergies indicates:  No Known Allergies  Objective:     Vital Signs (Most Recent):  Temp: 98 °F (36.7 °C) (01/12/19 0759)  Pulse: 72 (01/12/19 0759)  Resp: 18 (01/12/19 0759)  BP: 138/76 (01/12/19 0759)  SpO2: 96 % (01/12/19 0759) Vital Signs (24h Range):  Temp:  [97.6 °F (36.4 °C)-98.2 °F (36.8 °C)] 98 °F (36.7 °C)  Pulse:  [67-85] 72  Resp:  [9-24] 18  SpO2:  [94 %-100 %] 96 %  BP: (120-143)/(61-89) 138/76     Weight: 106.9 kg (235 lb 10.8 oz)  Body mass index is 31.96 kg/m².    Intake/Output - Last 3 Shifts       01/10 0700 - 01/11 0659 01/11 0700 - 01/12 0659 01/12 0700 - 01/13 0659    P.O. 0 490     I.V. (mL/kg) 4800 (44) 1591.7 (14.9)     IV Piggyback 200 350     Total Intake(mL/kg) 5000 (45.8) 2431.7 (22.7)     Urine (mL/kg/hr) 1400 (0.5) 700 (0.3)     Stool       Blood  10     Total Output 1400 710     Net +3600 +1721.7            Urine Occurrence 2 x      Stool Occurrence  0 x            Physical Exam   Constitutional: He is oriented to person, place, and time. He appears well-developed and well-nourished.   HENT:   Head: Normocephalic and atraumatic.   Eyes: EOM are normal.   Cardiovascular: Normal rate and regular rhythm.   Pulmonary/Chest: Effort normal. No respiratory distress.   Abdominal: Soft. There is tenderness (incisional).   Wound dressings c/d/i   Musculoskeletal: Normal range of motion.   Neurological: He is alert and oriented to person, place, and time.   Skin: Skin is warm and dry.   Psychiatric: He has a normal mood and affect. Thought content normal.   Vitals reviewed.      Significant Labs:  CBC:   Recent Labs   Lab 01/12/19  0445   WBC 7.16   RBC 3.92*   HGB 11.9*   HCT 35.2*   *   MCV 90   MCH 30.4   MCHC 33.8     CMP:   Recent Labs   Lab 01/12/19  0445   GLU 81   CALCIUM 8.6*   ALBUMIN 2.8*   PROT 5.9*      K 3.3*   CO2 20*      BUN 11   CREATININE 0.9   ALKPHOS 105   ALT 48*   AST 72*   BILITOT 0.7       Significant Diagnostics:  I have reviewed all pertinent imaging results/findings within the past 24 hours.

## 2019-01-12 NOTE — ANESTHESIA POSTPROCEDURE EVALUATION
Anesthesia Post Evaluation    Patient: Des Dash    Procedure(s) Performed: Procedure(s) (LRB):  XI ROBOTIC CHOLECYSTECTOMY (N/A)    Final Anesthesia Type: general  Patient location during evaluation: PACU  Patient participation: Yes- Able to Participate  Level of consciousness: awake and alert  Post-procedure vital signs: reviewed and not stable  Pain management: adequate  Airway patency: patent  PONV status at discharge: No PONV  Anesthetic complications: no      Cardiovascular status: blood pressure returned to baseline  Respiratory status: unassisted  Hydration status: euvolemic  Follow-up not needed.        Visit Vitals  /74 (BP Location: Right arm, Patient Position: Lying)   Pulse 61   Temp 36.8 °C (98.3 °F) (Oral)   Resp 20   Ht 6' (1.829 m)   Wt 106.9 kg (235 lb 10.8 oz)   SpO2 97%   BMI 31.96 kg/m²       Pain/Chloé Score: Pain Rating Prior to Med Admin: 5 (1/12/2019  9:10 AM)  Pain Rating Post Med Admin: 0 (1/12/2019 10:10 AM)  Chloé Score: 10 (1/11/2019  7:00 PM)

## 2019-01-12 NOTE — ASSESSMENT & PLAN NOTE
Blood culture -E coli -will plan to use PO levquin for 14 days from negative blood cultures .  Will follow repeat cultures .  UA is unremarkable .  Source is likely intra-abdominal

## 2019-01-12 NOTE — CONSULTS
[facility]  Infectious Disease  Consult Note    Patient Name: Des Dash  MRN: [id number]  Admission Date: 1/7/2019  Hospital Length of Stay: 4 days  Attending Physician: Manuel Darby MD  Primary Care Provider: Juan José Batres MD     Isolation Status: No active isolations    Patient information was obtained from patient, past medical records and ER records.      Consults  Assessment/Plan:     Bacteremia    Blood culture -E coli -will plan to use PO levquin for 14 days from negative blood cultures .  Will follow repeat cultures .  UA is unremarkable .  Source is likely intra-abdominal      Cholecystitis    Will follow surgery for cholecystectomy      SARAH (obstructive sleep apnea)    CPAP as tolerated         Thank you for your consult. I will follow-up with patient. Please contact us if you have any additional questions.    Yogesh Shah MD  Infectious Disease  [facility]    Subjective:     Principal Problem: Acute pancreatitis    HPI: 58 year old man who was admitted with history of abdominal  pain . Since admission, CT scan showed findings  suspicious for cholecystitis with inflamed appearing gallbladder.HIDA scan showed-decreased gallbladder ejection fraction of 14% at 40 min with findings suggesting chronic cholecystitis versus biliary dyskinesia.    Blood cultures are now positive for E coli        Past Medical History:   Diagnosis Date    SARAH (obstructive sleep apnea)     Tobacco abuse     1ppd x 42 years       History reviewed. No pertinent surgical history.    Review of patient's allergies indicates:  No Known Allergies    Medications:  Medications Prior to Admission   Medication Sig    ALPRAZolam (XANAX) 1 MG tablet Take 1 mg by mouth.    aspirin (ECOTRIN) 81 MG EC tablet Take 81 mg by mouth once daily.    dextroamphetamine-amphetamine (ADDERALL) 10 mg Tab Take 10 mg by mouth.    dicyclomine (BENTYL) 10 MG capsule Take 10 mg by mouth.    esomeprazole  (NEXIUM) 40 MG capsule Take 40 mg by mouth.    fluticasone (FLONASE) 50 mcg/actuation nasal spray 2 sprays by Nasal route.    niacin (NIASPAN) 750 MG CR tablet Take 750 mg by mouth.    pravastatin (PRAVACHOL) 80 MG tablet Take 80 mg by mouth.    sotalol (BETAPACE) 80 MG tablet Take 80 mg by mouth.    traMADol (ULTRAM) 50 mg tablet Take 50 mg by mouth.     Antibiotics (From admission, onward)    Start     Stop Route Frequency Ordered    01/12/19 0100  cefazolin (ANCEF) 2 gram in dextrose 5% 50 mL IVPB (premix)      01/12 1659 IV Every 8 hours (non-standard times) 01/11/19 1947    01/10/19 1615  piperacillin-tazobactam 4.5 g in dextrose 5 % 100 mL IVPB (ready to mix system)      -- IV Every 8 hours (non-standard times) 01/10/19 1504        Antifungals (From admission, onward)    None        Antivirals (From admission, onward)    None             There is no immunization history on file for this patient.    Family History     None        Social History     Socioeconomic History    Marital status:      Spouse name: None    Number of children: None    Years of education: None    Highest education level: None   Social Needs    Financial resource strain: None    Food insecurity - worry: None    Food insecurity - inability: None    Transportation needs - medical: None    Transportation needs - non-medical: None   Occupational History    None   Tobacco Use    Smoking status: Current Every Day Smoker    Smokeless tobacco: Never Used   Substance and Sexual Activity    Alcohol use: No     Frequency: Never    Drug use: No    Sexual activity: Not Currently   Other Topics Concern    None   Social History Narrative    None     Review of Systems   Constitutional: Positive for chills and fever. Negative for activity change, appetite change, diaphoresis and fatigue.   HENT: Negative for facial swelling, sore throat, tinnitus and trouble swallowing.    Eyes: Negative for photophobia and visual disturbance.    Respiratory: Negative for apnea, cough, chest tightness, shortness of breath and wheezing.    Cardiovascular: Negative for chest pain, palpitations and leg swelling.   Gastrointestinal: Positive for abdominal distention, abdominal pain and nausea. Negative for constipation, diarrhea and vomiting.   Endocrine: Negative for polydipsia, polyphagia and polyuria.   Genitourinary: Negative for decreased urine volume, dysuria, flank pain, frequency and hematuria.   Musculoskeletal: Positive for back pain and neck pain. Negative for arthralgias, joint swelling, myalgias and neck stiffness.   Skin: Negative for pallor and rash.   Allergic/Immunologic: Negative for immunocompromised state.   Neurological: Negative for dizziness, seizures, syncope, weakness, numbness and headaches.   Psychiatric/Behavioral: Negative for confusion, hallucinations and suicidal ideas. The patient is not nervous/anxious.    All other systems reviewed and are negative.    Objective:     Vital Signs (Most Recent):  Temp: 97.8 °F (36.6 °C) (01/12/19 0359)  Pulse: 67 (01/12/19 0359)  Resp: 18 (01/12/19 0359)  BP: 134/78 (01/12/19 0359)  SpO2: 98 % (01/12/19 0359) Vital Signs (24h Range):  Temp:  [97.6 °F (36.4 °C)-98.9 °F (37.2 °C)] 97.8 °F (36.6 °C)  Pulse:  [62-85] 67  Resp:  [9-24] 18  SpO2:  [94 %-100 %] 98 %  BP: (120-143)/(61-89) 134/78     Weight: 106.9 kg (235 lb 10.8 oz)  Body mass index is 31.96 kg/m².    Estimated Creatinine Clearance: 110.2 mL/min (based on SCr of 0.9 mg/dL).    Physical Exam   Constitutional: He is oriented to person, place, and time. He appears well-developed and well-nourished. No distress.   HENT:   Head: Normocephalic and atraumatic.   Mouth/Throat: Oropharynx is clear and moist.   Eyes: Conjunctivae are normal. Pupils are equal, round, and reactive to light. No scleral icterus.   Neck: Normal range of motion. Neck supple. No JVD present. No thyromegaly present.   Cardiovascular: Normal rate and regular rhythm. Exam  reveals no gallop and no friction rub.   No murmur heard.  Pulmonary/Chest: Effort normal and breath sounds normal. No respiratory distress. He has no wheezes. He has no rales.   Abdominal: Soft. Bowel sounds are normal. He exhibits no distension. There is tenderness (mild ). There is no guarding.   Genitourinary:   Genitourinary Comments: Deferred   Musculoskeletal: Normal range of motion.   Neurological: He is alert and oriented to person, place, and time. No cranial nerve deficit.   Skin: Skin is warm. Capillary refill takes less than 2 seconds. He is not diaphoretic. No erythema.   Psychiatric: He has a normal mood and affect.   Nursing note and vitals reviewed.      Significant Labs:   Blood Culture:   Recent Labs   Lab 01/09/19 2006 01/09/19  2017   LABBLOO Gram stain lexie bottle: Gram negative rods   Results called to and read back by: Lakisha Larkin RN  01/10/2019  14:44  Gram stain aer bottle: Gram negative rods   Positive results previously called 01/10/2019  18:32  ESCHERICHIA COLI  For susceptibility see order # [id number]   Gram stain aer bottle: Gram negative rods   Results called to and read back by: Lakisha Larkin RN  01/10/2019  14:44  ESCHERICHIA COLI     BMP:   Recent Labs   Lab 01/12/19  0445   GLU 81      K 3.3*      CO2 20*   BUN 11   CREATININE 0.9   CALCIUM 8.6*   MG 1.8     CBC:   Recent Labs   Lab 01/11/19  0540 01/12/19  0445   WBC 6.63 7.16   HGB 13.6* 11.9*   HCT 40.3 35.2*   * 124*     All pertinent labs within the past 24 hours have been reviewed.    Significant Imaging: I have reviewed all pertinent imaging results/findings within the past 24 hours.

## 2019-01-12 NOTE — SUBJECTIVE & OBJECTIVE
Past Medical History:   Diagnosis Date    SARAH (obstructive sleep apnea)     Tobacco abuse     1ppd x 42 years       History reviewed. No pertinent surgical history.    Review of patient's allergies indicates:  No Known Allergies    Medications:  Medications Prior to Admission   Medication Sig    ALPRAZolam (XANAX) 1 MG tablet Take 1 mg by mouth.    aspirin (ECOTRIN) 81 MG EC tablet Take 81 mg by mouth once daily.    dextroamphetamine-amphetamine (ADDERALL) 10 mg Tab Take 10 mg by mouth.    dicyclomine (BENTYL) 10 MG capsule Take 10 mg by mouth.    esomeprazole (NEXIUM) 40 MG capsule Take 40 mg by mouth.    fluticasone (FLONASE) 50 mcg/actuation nasal spray 2 sprays by Nasal route.    niacin (NIASPAN) 750 MG CR tablet Take 750 mg by mouth.    pravastatin (PRAVACHOL) 80 MG tablet Take 80 mg by mouth.    sotalol (BETAPACE) 80 MG tablet Take 80 mg by mouth.    traMADol (ULTRAM) 50 mg tablet Take 50 mg by mouth.     Antibiotics (From admission, onward)    Start     Stop Route Frequency Ordered    01/12/19 0100  cefazolin (ANCEF) 2 gram in dextrose 5% 50 mL IVPB (premix)      01/12 1659 IV Every 8 hours (non-standard times) 01/11/19 1947    01/10/19 1615  piperacillin-tazobactam 4.5 g in dextrose 5 % 100 mL IVPB (ready to mix system)      -- IV Every 8 hours (non-standard times) 01/10/19 1504        Antifungals (From admission, onward)    None        Antivirals (From admission, onward)    None             There is no immunization history on file for this patient.    Family History     None        Social History     Socioeconomic History    Marital status:      Spouse name: None    Number of children: None    Years of education: None    Highest education level: None   Social Needs    Financial resource strain: None    Food insecurity - worry: None    Food insecurity - inability: None    Transportation needs - medical: None    Transportation needs - non-medical: None   Occupational History     None   Tobacco Use    Smoking status: Current Every Day Smoker    Smokeless tobacco: Never Used   Substance and Sexual Activity    Alcohol use: No     Frequency: Never    Drug use: No    Sexual activity: Not Currently   Other Topics Concern    None   Social History Narrative    None     Review of Systems   Constitutional: Positive for chills and fever. Negative for activity change, appetite change, diaphoresis and fatigue.   HENT: Negative for facial swelling, sore throat, tinnitus and trouble swallowing.    Eyes: Negative for photophobia and visual disturbance.   Respiratory: Negative for apnea, cough, chest tightness, shortness of breath and wheezing.    Cardiovascular: Negative for chest pain, palpitations and leg swelling.   Gastrointestinal: Positive for abdominal distention, abdominal pain and nausea. Negative for constipation, diarrhea and vomiting.   Endocrine: Negative for polydipsia, polyphagia and polyuria.   Genitourinary: Negative for decreased urine volume, dysuria, flank pain, frequency and hematuria.   Musculoskeletal: Positive for back pain and neck pain. Negative for arthralgias, joint swelling, myalgias and neck stiffness.   Skin: Negative for pallor and rash.   Allergic/Immunologic: Negative for immunocompromised state.   Neurological: Negative for dizziness, seizures, syncope, weakness, numbness and headaches.   Psychiatric/Behavioral: Negative for confusion, hallucinations and suicidal ideas. The patient is not nervous/anxious.    All other systems reviewed and are negative.    Objective:     Vital Signs (Most Recent):  Temp: 97.8 °F (36.6 °C) (01/12/19 0359)  Pulse: 67 (01/12/19 0359)  Resp: 18 (01/12/19 0359)  BP: 134/78 (01/12/19 0359)  SpO2: 98 % (01/12/19 0359) Vital Signs (24h Range):  Temp:  [97.6 °F (36.4 °C)-98.9 °F (37.2 °C)] 97.8 °F (36.6 °C)  Pulse:  [62-85] 67  Resp:  [9-24] 18  SpO2:  [94 %-100 %] 98 %  BP: (120-143)/(61-89) 134/78     Weight: 106.9 kg (235 lb 10.8  oz)  Body mass index is 31.96 kg/m².    Estimated Creatinine Clearance: 110.2 mL/min (based on SCr of 0.9 mg/dL).    Physical Exam   Constitutional: He is oriented to person, place, and time. He appears well-developed and well-nourished. No distress.   HENT:   Head: Normocephalic and atraumatic.   Mouth/Throat: Oropharynx is clear and moist.   Eyes: Conjunctivae are normal. Pupils are equal, round, and reactive to light. No scleral icterus.   Neck: Normal range of motion. Neck supple. No JVD present. No thyromegaly present.   Cardiovascular: Normal rate and regular rhythm. Exam reveals no gallop and no friction rub.   No murmur heard.  Pulmonary/Chest: Effort normal and breath sounds normal. No respiratory distress. He has no wheezes. He has no rales.   Abdominal: Soft. Bowel sounds are normal. He exhibits no distension. There is tenderness (mild ). There is no guarding.   Genitourinary:   Genitourinary Comments: Deferred   Musculoskeletal: Normal range of motion.   Neurological: He is alert and oriented to person, place, and time. No cranial nerve deficit.   Skin: Skin is warm. Capillary refill takes less than 2 seconds. He is not diaphoretic. No erythema.   Psychiatric: He has a normal mood and affect.   Nursing note and vitals reviewed.      Significant Labs:   Blood Culture:   Recent Labs   Lab 01/09/19 2006 01/09/19  2017   LABBLOO Gram stain lexie bottle: Gram negative rods   Results called to and read back by: Lakisha Larkin RN  01/10/2019  14:44  Gram stain aer bottle: Gram negative rods   Positive results previously called 01/10/2019  18:32  ESCHERICHIA COLI  For susceptibility see order # [id number]   Gram stain aer bottle: Gram negative rods   Results called to and read back by: Lakisha Larkin RN  01/10/2019  14:44  ESCHERICHIA COLI     BMP:   Recent Labs   Lab 01/12/19  0445   GLU 81      K 3.3*      CO2 20*   BUN 11   CREATININE 0.9   CALCIUM 8.6*   MG 1.8     CBC:   Recent Labs   Lab  01/11/19  0540 01/12/19  0445   WBC 6.63 7.16   HGB 13.6* 11.9*   HCT 40.3 35.2*   * 124*     All pertinent labs within the past 24 hours have been reviewed.    Significant Imaging: I have reviewed all pertinent imaging results/findings within the past 24 hours.

## 2019-01-12 NOTE — HPI
58 year old man who was admitted with history of abdominal  pain . Since admission, CT scan showed findings  suspicious for cholecystitis with inflamed appearing gallbladder.HIDA scan showed-decreased gallbladder ejection fraction of 14% at 40 min with findings suggesting chronic cholecystitis versus biliary dyskinesia.    Blood cultures are now positive for E coli

## 2019-01-12 NOTE — DISCHARGE INSTRUCTIONS
[facility]  Instructions for Patients and Families    If you have a smartphone with a front camera, you can now do a video visit instead of an office visit after surgery.  Please contact us at [phone] to schedule this or to convert your scheduled appointment to a video visit!    Before surgery:  The pre-op nurse from the hospital will call you before the day of your surgery to confirm your arrival time.  The time of your surgery may change due to emergencies or other unforeseen events.  Do not eat or drink anything after midnight the night before your procedure, except for clear liquids up to three hours before your surgery time, and sips of water with medication.  If you are not diabetic, it is recommended that you drink a glass of clear fruit juice (apple, grape, cranberry, not orange) three hours before your surgery time, but nothing after that.  If you are diabetic, you may have water or sugar-free clear liquids such as Crystal Light up to three hours before your surgery time.    Day of Surgery:  · You will go to a pre-op area where an IV will be started and you will speak to the anesthesiologist and surgeon.  · Your family will be updated throughout the operation.  After surgery, your family member may be taken to a private room for consultation with the surgeon.  This is for the privacy of your medical information and does not necessarily mean there is anything wrong.    If your incisions have:  · Glue:  You may take a bath or shower immediately and wash your skin as you normally do.  The glue will eventually crumble or peel off. Do not let your incisions soak under water.  · Strips: Leave them on, but it is OK if they fall off on their own. It is OK to get them wet 48 hours after surgery.  · Bandage: You may remove it 2 days after your surgery, and then you may leave the incision open and take a shower or bath, unless otherwise instructed. If your bandage has clear  plastic, you may shower with it on and then remove it 2 days after surgery.    Activities  · Walking is recommended after surgery; bed rest is not recommended unless specifically ordered.  · If you have had abdominal surgery, do not lift over 20 pounds for 4 weeks after surgery.  · If you have had hernia surgery, do not lift over 20 pounds for 6 weeks after surgery.  · You may drive when you are off your post-operative pain medication.  · Do not smoke after surgery, it decreases your ability to heal and increases the risk of infection and pneumonia.    Diet:  Drink lots of fluids after surgery.  You might not have much of an appetite at first, you may eat regular food when you feel ready, unless you are given special diet instructions.    Post-operative symptoms and medications  · It is safe to take over-the-counter medications for constipation, heartburn, sleep, or itching if needed.  Prescription pain medication may contain acetaminophen (Tylenol), so you should not take additional acetaminophen (Tylenol) at the same time as your pain medication.  · You may experience nausea, low fever/chills, and clear drainage from your incision, sometimes up to a month after surgery.  Notify our office if you have fever over 101 degrees, worsening redness around your incision, thick cloudy drainage, or inability to drink any liquids.  · You will experience some level of pain after surgery.  Your pain medication should help with the pain, but may not be able to eliminate it entirely.  Pain will decrease with time, and most pain will be gone by 4 to 6 weeks after surgery.  · We are not able to call in prescriptions for pain medication after hours or on weekends.  If your pain medication is ineffective or you will run out soon and need a refill, please call our office at [phone].  We are not able to replace pain medication that has been lost or stolen.    After surgery, you will either be discharged home or admitted to the  "hospital.  If you are admitted to the hospital, one of the surgeons or a physician assistant will see you once a day.  Due to scheduled surgery, we may see you in the afternoon or at night; however, your nurse is able to page us at any time.  If you feel there is a situation that is not being addressed properly, please dial [phone] from the phone in your room.    Follow-up appointment  · You will see your surgeon or a physician assistant in clinic for a follow-up appointment at either our [facility] ([facility] or [facility] (off [facility]) locations, usually between one and four weeks after your surgery.  · The hospital nurses can make your follow up appointment, or you can make it online at [email or web address] or call [phone].  · If you have a smartphone with the [employer] luis fernando, please let us know if you would like to do a video visit with your phone instead of a post-op office visit.    If you are signed up for [facility], install the "[employer]" luis fernando to access your test results, send messages to your doctors, and schedule appointments from your phone!        Avoid taking any sedating medications such as Xanax in conjunction with pain medication (Hydrocodone). You can become over sedated.   "

## 2019-01-12 NOTE — PLAN OF CARE
Problem: Adult Inpatient Plan of Care  Goal: Plan of Care Review  Outcome: Ongoing (interventions implemented as appropriate)  Pt complains of generalized abdominal pain. Pain medication is effective. Dressing is dry and intact. Abdomen is non distended. Pt tolerating diet well. Pt denies n/v. IV ABX given per order IV fluids are infusing. No injuries. Will continue to monitor. 12 hour chart check is completed.

## 2019-01-12 NOTE — TRANSFER OF CARE
Anesthesia Transfer of Care Note    Patient: Des Dash    Procedure(s) Performed: Procedure(s) (LRB):  XI ROBOTIC CHOLECYSTECTOMY (N/A)    Patient location: PACU    Anesthesia Type: general    Transport from OR: Transported from OR on room air with adequate spontaneous ventilation    Post pain: adequate analgesia    Post assessment: no apparent anesthetic complications    Post vital signs: stable    Level of consciousness: awake    Nausea/Vomiting: no nausea/vomiting    Complications: none    Transfer of care protocol was followed      Last vitals:   Visit Vitals  BP (!) 141/82   Pulse 62   Temp 37.2 °C (98.9 °F) (Oral)   Resp 18   Ht 6' (1.829 m)   Wt 109.1 kg (240 lb 10.1 oz)   SpO2 98%   BMI 32.64 kg/m²

## 2019-01-12 NOTE — PROGRESS NOTES
Patient states CPAP full mask gave him a terrible headache and does not want to wear it tonight. Patient wears nasal mask at home. Patient is wearing NC at 2 L/M O2. SpO2 98%

## 2019-01-12 NOTE — OP NOTE
"Operative Note       SURGERY DATE:  01/11/2019    PRE-OP DIAGNOSIS:  Calculus of gallbladder with acute cholecystitis without obstruction [K80.00]    POST-OP DIAGNOSIS:  Calculus of gallbladder with acute cholecystitis without obstruction [K80.00]   Active Hospital Problems    Diagnosis  POA    *Acute pancreatitis [K85.90]  Yes    Cholecystitis [K81.9]  Unknown    Bacteremia [R78.81]  Unknown    Thickening of wall of gallbladder [K82.8]  Yes    SARAH (obstructive sleep apnea) [G47.33]  Unknown    Transaminitis [R74.0]  Yes    Tobacco abuse [Z72.0]  Unknown      Resolved Hospital Problems   No resolved problems to display.       Procedure(s) (LRB):  XI ROBOTIC CHOLECYSTECTOMY (N/A)    Surgeon(s) and Role:     * Koby Will MD - Primary    ASSISTANTS: None  ANESTHESIA: General    FINDINGS: the gall bladder was acutely inflamed and contained multiple stones.    ESTIMATED BLOOD LOSS: 5 mL              COMPLICATIONS:  None    SPECIMEN:  gall bladder    Implants: None    INDICATION:  Acute cholecystitis with pancreatitis.    DESCRIPTION OF PROCEDURE:    The patient was taken to the operating room and underwent general anesthesia with orotracheal intubation. Once the patient was sleep, a "time-out" was called, the patient's ID, the site of surgery, the names of participants, and the planned surgery were confirmed prior to making the incision. A Verres needle was inserted on the left upper quadrant, and achieved pneumoperitoneum. Once gained access, insufflation was achieved up to 15 mm Hg. Then four 8 mm metal trocars were positioned in a line on the abdomen toward the gall bladder. The patient was then positioned in reverse trendelenburg position, and docking was completed. The gall bladder was retracted cephalad, and the Calot's triangle was exposed. By direct visulaization after dissection, the biliary tree was visualized. The cystic duct and artery were identified, and clipped twice distally and single proximally " with Hemolock clips. The structures divided, and then gall bladder was then mobilized off the fossa. The gall bladder was then removed from the peritoneal cavity after placing in a 5 mm bag. With the help of Firefly, there was no evidence of bile leakage at the operative field. The port sites were all injected with 0.25% Marcaine and wound closed in the usual fashion with 4-0 Vicryl. The incisions were dressed with steristrips and band-aids. The patient was later awakened and extubated.           CONDITION: Good    DISPOSITION: PACU - hemodynamically stable.     Koby Will

## 2019-01-12 NOTE — ANESTHESIA POSTPROCEDURE EVALUATION
Anesthesia Post Evaluation    Patient: Des Dash    Procedure(s) Performed: Procedure(s) (LRB):  XI ROBOTIC CHOLECYSTECTOMY (N/A)    Final Anesthesia Type: general  Patient location during evaluation: PACU  Patient participation: Yes- Able to Participate  Level of consciousness: awake and alert  Post-procedure vital signs: reviewed and not stable  Pain management: adequate  Airway patency: patent  PONV status at discharge: No PONV      Cardiovascular status: blood pressure returned to baseline  Respiratory status: unassisted  Hydration status: euvolemic  Follow-up not needed.        Visit Vitals  /74 (BP Location: Right arm, Patient Position: Lying)   Pulse 61   Temp 36.8 °C (98.3 °F) (Oral)   Resp 20   Ht 6' (1.829 m)   Wt 106.9 kg (235 lb 10.8 oz)   SpO2 97%   BMI 31.96 kg/m²       Pain/Chloé Score: Pain Rating Prior to Med Admin: 5 (1/12/2019  9:10 AM)  Pain Rating Post Med Admin: 0 (1/12/2019 10:10 AM)  Chloé Score: 10 (1/11/2019  7:00 PM)

## 2019-01-12 NOTE — ASSESSMENT & PLAN NOTE
S/p robotic cholecystectomy   pod1 - ok to advance diet as tolerated to low fat   Ok for d/c from surgery standpoint.

## 2019-01-12 NOTE — PROGRESS NOTES
[facility] -   General Surgery  Progress Note    Subjective:     History of Present Illness:  Des Dash is a 58 y.o. male wo presented 3 days ago with acute pancreatitis also noted to have elvated bilirubin and transaminases. Lipase and LFT's are trending down and nearly normal. His pain is overall improving and he denies nasuea or emesis. He c/o epigastric pain after clear liquids. He had a fever last night of 102. US and CT negative for cholelithiasis. Pt reports over the last several months he has had about 5 episodes of colicky epigastric/ruq pain after meals. Usually sx resolve within 30 minutes to 1 hour.   General Surgery was consulted for further recommendations.       Post-Op Info:  Procedure(s) (LRB):  XI ROBOTIC CHOLECYSTECTOMY (N/A)   1 Day Post-Op     Interval History: s/p robotic beata - doing well and tolerating liquids.     Medications:  Continuous Infusions:   sodium chloride 0.9% Stopped (01/11/19 1400)    lactated ringers Stopped (01/12/19 0301)     Scheduled Meds:   ceFAZolin (ANCEF) IVPB  2 g Intravenous Q8H    chlorhexidine  10 mL Mouth/Throat BID    enoxaparin  40 mg Subcutaneous Daily    lidocaine (PF) 10 mg/ml (1%)  1 mL Intradermal Once    nicotine  1 patch Transdermal Daily    nozaseptin   Each Nare BID    oxymetazoline  2 spray Each Nare BID    pantoprazole  40 mg Intravenous Daily    piperacillin-tazobactam (ZOSYN) IVPB  4.5 g Intravenous Q8H    senna-docusate 8.6-50 mg  1 tablet Oral BID     PRN Meds:acetaminophen, bisacodyl, butalbital-acetaminophen-caffeine -40 mg, cloNIDine, dextrose 50%, dextrose 50%, glucagon (human recombinant), glucose, glucose, HYDROcodone-acetaminophen, HYDROmorphone, HYDROmorphone, ibuprofen, lactulose, lorazepam, ondansetron, ondansetron, promethazine (PHENERGAN) IVPB, ramelteon, sodium chloride 0.9%     Review of patient's allergies indicates:  No Known Allergies  Objective:     Vital Signs (Most Recent):  Temp: 98 °F  (36.7 °C) (01/12/19 0759)  Pulse: 72 (01/12/19 0759)  Resp: 18 (01/12/19 0759)  BP: 138/76 (01/12/19 0759)  SpO2: 96 % (01/12/19 0759) Vital Signs (24h Range):  Temp:  [97.6 °F (36.4 °C)-98.2 °F (36.8 °C)] 98 °F (36.7 °C)  Pulse:  [67-85] 72  Resp:  [9-24] 18  SpO2:  [94 %-100 %] 96 %  BP: (120-143)/(61-89) 138/76     Weight: 106.9 kg (235 lb 10.8 oz)  Body mass index is 31.96 kg/m².    Intake/Output - Last 3 Shifts       01/10 0700 - 01/11 0659 01/11 0700 - 01/12 0659 01/12 0700 - 01/13 0659    P.O. 0 490     I.V. (mL/kg) 4800 (44) 1591.7 (14.9)     IV Piggyback 200 350     Total Intake(mL/kg) 5000 (45.8) 2431.7 (22.7)     Urine (mL/kg/hr) 1400 (0.5) 700 (0.3)     Stool       Blood  10     Total Output 1400 710     Net +3600 +1721.7            Urine Occurrence 2 x      Stool Occurrence 0 x            Physical Exam   Constitutional: He is oriented to person, place, and time. He appears well-developed and well-nourished.   HENT:   Head: Normocephalic and atraumatic.   Eyes: EOM are normal.   Cardiovascular: Normal rate and regular rhythm.   Pulmonary/Chest: Effort normal. No respiratory distress.   Abdominal: Soft. There is tenderness (incisional).   Wound dressings c/d/i   Musculoskeletal: Normal range of motion.   Neurological: He is alert and oriented to person, place, and time.   Skin: Skin is warm and dry.   Psychiatric: He has a normal mood and affect. Thought content normal.   Vitals reviewed.      Significant Labs:  CBC:   Recent Labs   Lab 01/12/19  0445   WBC 7.16   RBC 3.92*   HGB 11.9*   HCT 35.2*   *   MCV 90   MCH 30.4   MCHC 33.8     CMP:   Recent Labs   Lab 01/12/19  0445   GLU 81   CALCIUM 8.6*   ALBUMIN 2.8*   PROT 5.9*      K 3.3*   CO2 20*      BUN 11   CREATININE 0.9   ALKPHOS 105   ALT 48*   AST 72*   BILITOT 0.7       Significant Diagnostics:  I have reviewed all pertinent imaging results/findings within the past 24 hours.    Assessment/Plan:     * Acute pancreatitis    S/p  robotic cholecystectomy   pod1 - ok to advance diet as tolerated to low fat   Ok for d/c from surgery standpoint.          Karla Spivey PA-C  General Surgery  [facility]

## 2019-01-12 NOTE — PLAN OF CARE
Problem: Adult Inpatient Plan of Care  Goal: Plan of Care Review  Outcome: Outcome(s) achieved Date Met: 01/12/19  Patient remains injury free. Pain managed with oral medication.tolerating diet. Stable condition. Discharging to home.

## 2019-01-13 NOTE — DISCHARGE SUMMARY
[facility] Hospital Medicine  Discharge Summary      Patient Name: Des Dash  MRN: [id number]  Admission Date: 1/7/2019  Hospital Length of Stay: 4 days  Discharge Date and Time: 1/12/2019  1:23 PM  Attending Physician:  Manuel Darby MD  Discharging Provider: Manuel Darby MD  Primary Care Provider: Juan José Batres MD      HPI:   58M h/o HA presents with epigastric pain x 1 day.  Onset sudden, burning characteristic.  Associated with N/V x 2.   Also c/o HA.  Denies fever, chills, diarrhea, constipation, hematochezia, melena, dysuria, hematuria, frequency, cough, congestion, and all other sxs at this time.   Denies ETOH use.  Current smoker.  In ER,  Lipase >1000.  CT/US negative for cholecystitis and pancreatitis.  Patient given 1L NS bolus.         Procedure(s) (LRB):  XI ROBOTIC CHOLECYSTECTOMY (N/A)      Hospital Course:   Pt admitted to Medical Surgical Unit for acute pancreatitis.  CT of abdomen showed findings suspicious for cholecystitis with inflamed appearing gallbladder.  US showed mild wall thickening of the gallbladder with no stones.  Elevated amylase, lipase, and LFTs noted.  Pt treated with IV hydration, bowel rest, analgesia prn, and antiemetics prn.  Lipase and LFTs trending down.  Pt verbalized symptom improvement with N/V and abdominal pain denied.  Pt smokes 1 ppd x 42 years and counseled on smoking cessation with understanding verified.  Diet advanced and pt unable to tolerate.  NPO status resumed with abdominal complaints continued.  Case discussed with GI with no further recommendations.  General Surgery consulted for further evaluation. Amylase and LFTs normalized with Lipase trending down.  Pt noted to be febrile with blood cultures + for gram negative rods.  IV antibiotics initiated and ID consulted.  HIDA scan obtained with results showing cystic and common surendra ducts are patent with decreased gallbladder ejection fraction of 14% at 40 min with  findings suggesting chronic cholecystitis versus biliary dyskinesia.  Surgical procedure planned for today.  Laparoscopic cholecystectomy by Dr. Will.  Upon removal and inspection, the gallbladder was grossly inflamed and filled with stones.  Uneventful post-op recovery.  Discharge plan to return home and complete a course of Ciprofloxacin for two weeks to cover for bacteremia.  Follow up blood cultures negative.     Consults:   Consults (From admission, onward)        Status Ordering Provider     Inpatient consult to General Surgery  Once     Provider:  (Not yet assigned)    Completed SARANYA GUTIERREZ          No new Assessment & Plan notes have been filed under this hospital service since the last note was generated.  Service: Hospital Medicine    Final Active Diagnoses:    Diagnosis Date Noted POA    PRINCIPAL PROBLEM:  Acute pancreatitis [K85.90] 01/08/2019 Yes    Cholecystitis [K81.9] 01/11/2019 Yes    Bacteremia [R78.81] 01/11/2019 Yes    Thickening of wall of gallbladder [K82.8]  Yes    SARAH (obstructive sleep apnea) [G47.33] 01/09/2019 Yes    Transaminitis [R74.0] 01/08/2019 Yes    Tobacco abuse [Z72.0] 01/08/2019 Yes      Problems Resolved During this Admission:       Discharged Condition: good    Disposition: Home or Self Care    Follow Up:  Follow-up Information     Koby Will MD. Schedule an appointment as soon as possible for a visit in 2 weeks.    Specialties:  General Surgery, Bariatrics  Why:  post op  Contact information:  [address] [address]  [phone]             Juan José Batres MD In 3 days.    Specialty:  Family Medicine  Why:  Hospital Follow Up - Acute Gallstone Pancreatitis - Gallbladder removal  Contact information:  [address] [facility] [address] [phone]                 Patient Instructions:      Diet Adult Regular   Order Comments: Lowfat, bland diet - avoid fried fatty foods     Notify your health care provider if you experience  any of the following:  temperature >100.4     Notify your health care provider if you experience any of the following:  persistent nausea and vomiting or diarrhea     Notify your health care provider if you experience any of the following:  severe uncontrolled pain     Activity as tolerated       Significant Diagnostic Studies: Labs: All labs within the past 24 hours have been reviewed    Pending Diagnostic Studies:     Procedure Component Value Units Date/Time    Comprehensive metabolic panel [[id number]] Collected:  01/12/19 0445    Order Status:  Sent Lab Status:  In process Updated:  01/12/19 0445    Specimen:  Blood     Hematology Profile [[id number]] Collected:  01/12/19 0445    Order Status:  Sent Lab Status:  In process Updated:  01/12/19 0445    Specimen:  Blood          Medications:  Reconciled Home Medications:      Medication List      START taking these medications    HYDROcodone-acetaminophen 5-325 mg per tablet  Commonly known as:  NORCO  Take 1 tablet by mouth every 6 (six) hours as needed for Pain.     levoFLOXacin 750 MG tablet  Commonly known as:  LEVAQUIN  Take 1 tablet (750 mg total) by mouth once daily. for 14 days        CONTINUE taking these medications    ADDERALL 10 mg Tab  Generic drug:  dextroamphetamine-amphetamine  Take 10 mg by mouth.     aspirin 81 MG EC tablet  Commonly known as:  ECOTRIN  Take 81 mg by mouth once daily.     dicyclomine 10 MG capsule  Commonly known as:  BENTYL  Take 10 mg by mouth.     esomeprazole 40 MG capsule  Commonly known as:  NEXIUM  Take 40 mg by mouth.     fluticasone 50 mcg/actuation nasal spray  Commonly known as:  FLONASE  2 sprays by Nasal route.     niacin 750 MG CR tablet  Commonly known as:  NIASPAN  Take 750 mg by mouth.     pravastatin 80 MG tablet  Commonly known as:  PRAVACHOL  Take 80 mg by mouth.     sotalol 80 MG tablet  Commonly known as:  BETAPACE  Take 80 mg by mouth.     XANAX 1 MG tablet  Generic drug:  ALPRAZolam  Take 1 mg by mouth.         STOP taking these medications    ULTRAM 50 mg tablet  Generic drug:  traMADol            Indwelling Lines/Drains at time of discharge:   Lines/Drains/Airways          None          Time spent on the discharge of patient: 30 minutes  Patient was seen and examined on the date of discharge and determined to be suitable for discharge.         Manuel Darby MD  Department of Hospital Medicine  [facility]

## 2019-01-14 ENCOUNTER — TELEPHONE (OUTPATIENT)
Dept: SURGERY | Facility: CLINIC | Age: 59
End: 2019-01-14

## 2019-01-14 NOTE — TELEPHONE ENCOUNTER
----- Message from Soila Anderson RN sent at 1/12/2019 12:06 PM CST -----  Patient needs 2 week follow up appt , post op. Please assist patient with appointment.  Thank you

## 2019-01-14 NOTE — TELEPHONE ENCOUNTER
Returned call spoke with Melinda/wife in regards to scheduling pre-op appt during conversation appt was scheduled for 01/25/2019 @ 1:40PM with Karla Cowan/JAVI. Wife verbalized an understanding

## 2019-01-14 NOTE — TELEPHONE ENCOUNTER
----- Message from Carlos Sr sent at 1/14/2019 11:00 AM CST -----  Contact: nella ( Wife)   Would like to setup post op appt.         [phone]

## 2019-01-14 NOTE — TELEPHONE ENCOUNTER
----- Message from Emerita Holt sent at 1/14/2019  8:48 AM CST -----  Contact: Melinda ( pt's wife)   Melinda called and stated that the pt had surgery on Friday 1/11/19 and needs to schedule a post op follow in 2 weeks. She also stated that the pt had not seen the doctor prior to surgery. She can be reached at [phone].    Thanks,  TF

## 2019-01-17 LAB
BACTERIA BLD CULT: NORMAL
BACTERIA BLD CULT: NORMAL

## 2019-01-25 ENCOUNTER — OFFICE VISIT (OUTPATIENT)
Dept: SURGERY | Facility: CLINIC | Age: 59
End: 2019-01-25
Payer: MEDICARE

## 2019-01-25 VITALS
WEIGHT: 237.63 LBS | BODY MASS INDEX: 32.23 KG/M2 | TEMPERATURE: 98 F | DIASTOLIC BLOOD PRESSURE: 85 MMHG | SYSTOLIC BLOOD PRESSURE: 110 MMHG | HEART RATE: 68 BPM

## 2019-01-25 DIAGNOSIS — K81.9 CHOLECYSTITIS: Primary | ICD-10-CM

## 2019-01-25 DIAGNOSIS — Z98.890 POST-OPERATIVE STATE: ICD-10-CM

## 2019-01-25 PROBLEM — K85.90 ACUTE PANCREATITIS: Status: RESOLVED | Noted: 2019-01-08 | Resolved: 2019-01-25

## 2019-01-25 PROBLEM — R78.81 BACTEREMIA: Status: RESOLVED | Noted: 2019-01-11 | Resolved: 2019-01-25

## 2019-01-25 PROBLEM — R74.01 TRANSAMINITIS: Status: RESOLVED | Noted: 2019-01-08 | Resolved: 2019-01-25

## 2019-01-25 PROCEDURE — 99999 PR PBB SHADOW E&M-EST. PATIENT-LVL III: CPT | Mod: PBBFAC,,, | Performed by: PHYSICIAN ASSISTANT

## 2019-01-25 PROCEDURE — 99024 PR POST-OP FOLLOW-UP VISIT: ICD-10-PCS | Mod: S$GLB,,, | Performed by: PHYSICIAN ASSISTANT

## 2019-01-25 PROCEDURE — 99999 PR PBB SHADOW E&M-EST. PATIENT-LVL III: ICD-10-PCS | Mod: PBBFAC,,, | Performed by: PHYSICIAN ASSISTANT

## 2019-01-25 PROCEDURE — 99024 POSTOP FOLLOW-UP VISIT: CPT | Mod: S$GLB,,, | Performed by: PHYSICIAN ASSISTANT

## 2019-01-25 NOTE — PROGRESS NOTES
Des Dash is status post robotic cholecystectomy and presents today for follow-up care.  He is doing well and has no complaints.     PE: Abdomen is soft, non-tender, non-distended.  Incisions clean, dry, and intact.    Pathology: acute and chronic cholecystitis    A/P:  Normal post-operative course.    The patient is instructed to avoid heavy lifting until 4 weeks after the surgery date.  Return to clinic as needed.

## 2019-02-13 ENCOUNTER — TELEPHONE (OUTPATIENT)
Dept: SURGERY | Facility: CLINIC | Age: 59
End: 2019-02-13

## 2019-02-13 NOTE — TELEPHONE ENCOUNTER
----- Message from Guy Santos LPN sent at 2/13/2019  8:52 AM CST -----  Not sure if this was already taken care of, I figured you would know.  Thanks!  ----- Message -----  From: Soila Anderson RN  Sent: 1/12/2019  12:06 PM  To: Bianca Muhammad LPN, Jania Julien MA, #    Patient needs 2 week follow up appt , post op. Please assist patient with appointment.  Thank you

## 2019-02-13 NOTE — TELEPHONE ENCOUNTER
Follow up in regards to follow up post-op appt, pt stated, He follow up Karla KEANE already. Encouraged pt to call with any questions/concerns. Patient verbalized an understanding

## 2019-03-07 ENCOUNTER — OFFICE VISIT (OUTPATIENT)
Dept: GASTROENTEROLOGY | Facility: CLINIC | Age: 59
End: 2019-03-07
Payer: MEDICARE

## 2019-03-07 VITALS — BODY MASS INDEX: 30.84 KG/M2 | WEIGHT: 240.31 LBS | HEIGHT: 74 IN

## 2019-03-07 DIAGNOSIS — E78.5 HYPERLIPIDEMIA, UNSPECIFIED HYPERLIPIDEMIA TYPE: ICD-10-CM

## 2019-03-07 DIAGNOSIS — Z90.49 STATUS POST CHOLECYSTECTOMY: ICD-10-CM

## 2019-03-07 DIAGNOSIS — G47.33 OBSTRUCTIVE SLEEP APNEA: ICD-10-CM

## 2019-03-07 DIAGNOSIS — K90.89 BILE SALT-INDUCED DIARRHEA: Primary | ICD-10-CM

## 2019-03-07 PROCEDURE — 99999 PR PBB SHADOW E&M-EST. PATIENT-LVL II: ICD-10-PCS | Mod: PBBFAC,,, | Performed by: INTERNAL MEDICINE

## 2019-03-07 PROCEDURE — 99999 PR PBB SHADOW E&M-EST. PATIENT-LVL II: CPT | Mod: PBBFAC,,, | Performed by: INTERNAL MEDICINE

## 2019-03-07 PROCEDURE — 99203 OFFICE O/P NEW LOW 30 MIN: CPT | Mod: S$GLB,,, | Performed by: INTERNAL MEDICINE

## 2019-03-07 PROCEDURE — 3008F PR BODY MASS INDEX (BMI) DOCUMENTED: ICD-10-PCS | Mod: CPTII,S$GLB,, | Performed by: INTERNAL MEDICINE

## 2019-03-07 PROCEDURE — 99203 PR OFFICE/OUTPT VISIT, NEW, LEVL III, 30-44 MIN: ICD-10-PCS | Mod: S$GLB,,, | Performed by: INTERNAL MEDICINE

## 2019-03-07 PROCEDURE — 3008F BODY MASS INDEX DOCD: CPT | Mod: CPTII,S$GLB,, | Performed by: INTERNAL MEDICINE

## 2019-03-07 RX ORDER — METOPROLOL TARTRATE 25 MG/1
TABLET, FILM COATED ORAL
COMMUNITY
Start: 2019-02-21

## 2019-03-07 RX ORDER — MONTELUKAST SODIUM 4 MG/1
1 TABLET, CHEWABLE ORAL 2 TIMES DAILY
Qty: 180 TABLET | Refills: 3 | Status: SHIPPED | OUTPATIENT
Start: 2019-03-07 | End: 2020-03-14

## 2019-03-07 NOTE — PROGRESS NOTES
Subjective:       Patient ID: Des Dash is a 58 y.o. male.    Chief Complaint: Abdominal Pain    The patient is here with complaint of lower abdominal discomfort associated with diarrhea.  He is status post cholecystectomy performed in January of this year after he presented with epigastric abdominal pain, elevated pancreatic enzymes along with a g negative bacteremia.  There were no stones present on imaging studies, however, [facility] HIDA scan revealed findings consistent with chronic cholecystitis versus biliary dyskinesia.  Pathology report on the gallbladder was consistent with chronic acalculous cholecystitis.The patient had been discharged on antibiotic coverage and is follow up with General surgery on January 25th related to his release.    The patient had information given to him after surgery regarding the possibility of developing some diarrhea.  The problem was not severe, and the lower abdominal discomfort seems to be related to bowel movements.  Stools are mushy in there is some increased frequency.  There is no blood in his stool and no black stool.  There is no fever chills or sweats. The patient has had colonoscopy performed last year with no significant abnormalities found.  We discussed the nature and physiology of post cholecystectomy diarrhea.  The patient would like something to help stabilize his bowel function.  Discussed how bile salt binders may be of benefit.              Review of Systems   Constitutional: Positive for fatigue. Negative for activity change, appetite change, chills, diaphoresis, fever and unexpected weight change.   HENT: Positive for postnasal drip. Negative for congestion, ear discharge, facial swelling, hearing loss, nosebleeds, sinus pressure, sneezing, tinnitus, trouble swallowing and voice change.    Eyes: Positive for visual disturbance. Negative for photophobia and redness.   Respiratory: Positive for wheezing. Negative for cough, chest tightness and shortness  of breath.    Cardiovascular: Negative for chest pain and palpitations.        Reduced exercise tolerance   Gastrointestinal: Negative for abdominal distention, abdominal pain, blood in stool, constipation, diarrhea, nausea, rectal pain and vomiting.   Genitourinary: Negative for difficulty urinating, discharge, dysuria, flank pain, frequency, hematuria, scrotal swelling, testicular pain and urgency.   Musculoskeletal: Positive for back pain. Negative for arthralgias, gait problem, joint swelling, myalgias and neck stiffness.        Joint stiffness   Skin: Negative for color change, pallor, rash and wound.   Neurological: Positive for headaches. Negative for dizziness, tremors, seizures, syncope, facial asymmetry, speech difficulty, weakness, light-headedness and numbness.   Hematological: Negative for adenopathy. Does not bruise/bleed easily.   Psychiatric/Behavioral: Negative for agitation, confusion, hallucinations, sleep disturbance and suicidal ideas.       Objective:      Physical Exam   Constitutional: He is oriented to person, place, and time. He appears well-developed and well-nourished. No distress.   HENT:   Head: Normocephalic and atraumatic.   Nose: Nose normal.   Mouth/Throat: Oropharynx is clear and moist. No oropharyngeal exudate.   Eyes: Conjunctivae are normal. Pupils are equal, round, and reactive to light. No scleral icterus.   Neck: Normal range of motion. Neck supple. No thyromegaly present.   Cardiovascular: Normal rate and regular rhythm. Exam reveals no gallop and no friction rub.   No murmur heard.  Pulmonary/Chest: Effort normal and breath sounds normal. No respiratory distress. He has no wheezes. He has no rales.   Abdominal: Soft. Bowel sounds are normal. He exhibits no distension and no mass. There is no tenderness. There is no rebound and no guarding.   Musculoskeletal: He exhibits no edema or tenderness.   Lymphadenopathy:     He has no cervical adenopathy.   Neurological: He is alert  and oriented to person, place, and time. He exhibits normal muscle tone. Coordination normal.   Skin: Skin is warm. No rash noted. He is not diaphoretic.   Psychiatric: He has a normal mood and affect. His behavior is normal. Judgment and thought content normal.   Vitals reviewed.      Assessment:     Status post cholecystectomy     Likely post cholecystectomy diarrhea    Reason pancreatitis    Hyperlipidemia    Obstructive sleep apnea  No diagnosis found.    Plan:   Trial of Colestid

## 2019-04-22 NOTE — TELEPHONE ENCOUNTER
----- Message from Russ Oro sent at 4/22/2019 10:14 AM CDT -----  Contact: Pt/Wife  .Type:  RX Refill Request    Who Called: Pt/Wife  Refill or New Rx: New Rx  RX Name and Strength: DICYCLOMINE 10 Mg  How is the patient currently taking it? (ex. 1XDay):   Is this a 30 day or 90 day RX: 90 day  Preferred Pharmacy with phone number: ..  [facility] [address] [place] [address] [address] [facility]  [address]  [facility] [address]  Phone: [phone] Fax: [phone]  Local or Mail Order: local  Ordering Provider: Johnny  Would the patient rather a call back or a response via [facility]? Call back  Best Call Back Number: .[phone] (home)   Additional Information:

## 2019-04-23 RX ORDER — DICYCLOMINE HYDROCHLORIDE 10 MG/1
10 CAPSULE ORAL 2 TIMES DAILY PRN
Qty: 30 CAPSULE | Refills: 1 | Status: SHIPPED | OUTPATIENT
Start: 2019-04-23 | End: 2019-07-08 | Stop reason: SDUPTHER

## 2019-07-09 RX ORDER — DICYCLOMINE HYDROCHLORIDE 10 MG/1
CAPSULE ORAL
Qty: 30 CAPSULE | Refills: 0 | Status: SHIPPED | OUTPATIENT
Start: 2019-07-09

## 2020-03-14 RX ORDER — MONTELUKAST SODIUM 4 MG/1
TABLET, CHEWABLE ORAL
Qty: 180 TABLET | Refills: 3 | Status: SHIPPED | OUTPATIENT
Start: 2020-03-14

## 2021-03-12 ENCOUNTER — HOSPITAL ENCOUNTER (EMERGENCY)
Facility: HOSPITAL | Age: 61
Discharge: HOME | End: 2021-03-12
Attending: EMERGENCY MEDICINE
Payer: MEDICARE

## 2021-03-12 VITALS
DIASTOLIC BLOOD PRESSURE: 66 MMHG | RESPIRATION RATE: 18 BRPM | HEART RATE: 54 BPM | OXYGEN SATURATION: 95 % | TEMPERATURE: 98 F | BODY MASS INDEX: 29.01 KG/M2 | WEIGHT: 226 LBS | SYSTOLIC BLOOD PRESSURE: 133 MMHG

## 2021-03-12 DIAGNOSIS — R10.13 EPIGASTRIC PAIN: ICD-10-CM

## 2021-03-12 LAB
ALBUMIN SERPL BCP-MCNC: 4.1 G/DL (ref 3.5–5.2)
ALP SERPL-CCNC: 82 U/L (ref 55–135)
ALT SERPL W/O P-5'-P-CCNC: 27 U/L (ref 10–44)
ANION GAP SERPL CALC-SCNC: 9 MMOL/L (ref 8–16)
AST SERPL-CCNC: 47 U/L (ref 10–40)
BASOPHILS # BLD AUTO: 0.03 K/UL (ref 0–0.2)
BASOPHILS NFR BLD: 0.4 % (ref 0–1.9)
BILIRUB SERPL-MCNC: 1.4 MG/DL (ref 0.1–1)
BUN SERPL-MCNC: 17 MG/DL (ref 8–23)
CALCIUM SERPL-MCNC: 9 MG/DL (ref 8.7–10.5)
CHLORIDE SERPL-SCNC: 105 MMOL/L (ref 95–110)
CO2 SERPL-SCNC: 25 MMOL/L (ref 23–29)
CREAT SERPL-MCNC: 1.4 MG/DL (ref 0.5–1.4)
DIFFERENTIAL METHOD: ABNORMAL
EOSINOPHIL # BLD AUTO: 0.1 K/UL (ref 0–0.5)
EOSINOPHIL NFR BLD: 1.2 % (ref 0–8)
ERYTHROCYTE [DISTWIDTH] IN BLOOD BY AUTOMATED COUNT: 13.2 % (ref 11.5–14.5)
EST. GFR  (AFRICAN AMERICAN): >60 ML/MIN/1.73 M^2
EST. GFR  (NON AFRICAN AMERICAN): 53 ML/MIN/1.73 M^2
GLUCOSE SERPL-MCNC: 104 MG/DL (ref 70–110)
HCT VFR BLD AUTO: 45.4 % (ref 40–54)
HGB BLD-MCNC: 15 G/DL (ref 14–18)
IMM GRANULOCYTES # BLD AUTO: 0.05 K/UL (ref 0–0.04)
IMM GRANULOCYTES NFR BLD AUTO: 0.6 % (ref 0–0.5)
LIPASE SERPL-CCNC: 17 U/L (ref 4–60)
LYMPHOCYTES # BLD AUTO: 0.9 K/UL (ref 1–4.8)
LYMPHOCYTES NFR BLD: 11.9 % (ref 18–48)
MCH RBC QN AUTO: 30.2 PG (ref 27–31)
MCHC RBC AUTO-ENTMCNC: 33 G/DL (ref 32–36)
MCV RBC AUTO: 92 FL (ref 82–98)
MONOCYTES # BLD AUTO: 0.6 K/UL (ref 0.3–1)
MONOCYTES NFR BLD: 8.3 % (ref 4–15)
NEUTROPHILS # BLD AUTO: 6 K/UL (ref 1.8–7.7)
NEUTROPHILS NFR BLD: 77.6 % (ref 38–73)
NRBC BLD-RTO: 0 /100 WBC
PLATELET # BLD AUTO: 168 K/UL (ref 150–350)
PMV BLD AUTO: 8.9 FL (ref 9.2–12.9)
POTASSIUM SERPL-SCNC: 4.2 MMOL/L (ref 3.5–5.1)
PROT SERPL-MCNC: 7.3 G/DL (ref 6–8.4)
RBC # BLD AUTO: 4.96 M/UL (ref 4.6–6.2)
SODIUM SERPL-SCNC: 139 MMOL/L (ref 136–145)
TROPONIN I SERPL DL<=0.01 NG/ML-MCNC: <0.006 NG/ML (ref 0–0.03)
WBC # BLD AUTO: 7.73 K/UL (ref 3.9–12.7)

## 2021-03-12 PROCEDURE — 93005 ELECTROCARDIOGRAM TRACING: CPT

## 2021-03-12 PROCEDURE — 25500020 PHARM REV CODE 255: Performed by: EMERGENCY MEDICINE

## 2021-03-12 PROCEDURE — 84484 ASSAY OF TROPONIN QUANT: CPT | Performed by: EMERGENCY MEDICINE

## 2021-03-12 PROCEDURE — 80053 COMPREHEN METABOLIC PANEL: CPT | Performed by: NURSE PRACTITIONER

## 2021-03-12 PROCEDURE — 93010 EKG 12-LEAD: ICD-10-PCS | Mod: ,,, | Performed by: INTERNAL MEDICINE

## 2021-03-12 PROCEDURE — 85025 COMPLETE CBC W/AUTO DIFF WBC: CPT | Performed by: NURSE PRACTITIONER

## 2021-03-12 PROCEDURE — 96374 THER/PROPH/DIAG INJ IV PUSH: CPT | Mod: 59

## 2021-03-12 PROCEDURE — 63600175 PHARM REV CODE 636 W HCPCS: Performed by: EMERGENCY MEDICINE

## 2021-03-12 PROCEDURE — 93010 ELECTROCARDIOGRAM REPORT: CPT | Mod: ,,, | Performed by: INTERNAL MEDICINE

## 2021-03-12 PROCEDURE — 96376 TX/PRO/DX INJ SAME DRUG ADON: CPT

## 2021-03-12 PROCEDURE — 96375 TX/PRO/DX INJ NEW DRUG ADDON: CPT

## 2021-03-12 PROCEDURE — 25000003 PHARM REV CODE 250: Performed by: EMERGENCY MEDICINE

## 2021-03-12 PROCEDURE — 83690 ASSAY OF LIPASE: CPT | Performed by: NURSE PRACTITIONER

## 2021-03-12 PROCEDURE — 99285 EMERGENCY DEPT VISIT HI MDM: CPT | Mod: 25

## 2021-03-12 RX ORDER — HYDROCODONE BITARTRATE AND ACETAMINOPHEN 5; 325 MG/1; MG/1
1 TABLET ORAL EVERY 4 HOURS PRN
Qty: 10 TABLET | Refills: 0 | Status: SHIPPED | OUTPATIENT
Start: 2021-03-12

## 2021-03-12 RX ORDER — MORPHINE SULFATE 4 MG/ML
4 INJECTION, SOLUTION INTRAMUSCULAR; INTRAVENOUS
Status: COMPLETED | OUTPATIENT
Start: 2021-03-12 | End: 2021-03-12

## 2021-03-12 RX ORDER — HYDROMORPHONE HYDROCHLORIDE 1 MG/ML
1 INJECTION, SOLUTION INTRAMUSCULAR; INTRAVENOUS; SUBCUTANEOUS
Status: DISCONTINUED | OUTPATIENT
Start: 2021-03-12 | End: 2021-03-12

## 2021-03-12 RX ORDER — MORPHINE SULFATE 4 MG/ML
8 INJECTION, SOLUTION INTRAMUSCULAR; INTRAVENOUS
Status: COMPLETED | OUTPATIENT
Start: 2021-03-12 | End: 2021-03-12

## 2021-03-12 RX ORDER — ONDANSETRON 2 MG/ML
4 INJECTION INTRAMUSCULAR; INTRAVENOUS
Status: COMPLETED | OUTPATIENT
Start: 2021-03-12 | End: 2021-03-12

## 2021-03-12 RX ADMIN — IOHEXOL 100 ML: 350 INJECTION, SOLUTION INTRAVENOUS at 03:03

## 2021-03-12 RX ADMIN — MORPHINE SULFATE 4 MG: 4 INJECTION, SOLUTION INTRAMUSCULAR; INTRAVENOUS at 01:03

## 2021-03-12 RX ADMIN — ONDANSETRON 4 MG: 2 INJECTION INTRAMUSCULAR; INTRAVENOUS at 01:03

## 2021-03-12 RX ADMIN — ALUMINUM HYDROXIDE, MAGNESIUM HYDROXIDE, SIMETHICONE 50 ML: 400; 400; 40 SUSPENSION ORAL at 12:03

## 2021-03-12 RX ADMIN — MORPHINE SULFATE 8 MG: 4 INJECTION, SOLUTION INTRAMUSCULAR; INTRAVENOUS at 02:03

## 2021-04-29 ENCOUNTER — PATIENT MESSAGE (OUTPATIENT)
Dept: RESEARCH | Facility: HOSPITAL | Age: 61
End: 2021-04-29

## 2021-07-01 ENCOUNTER — PATIENT MESSAGE (OUTPATIENT)
Dept: ADMINISTRATIVE | Facility: OTHER | Age: 61
End: 2021-07-01

## 2021-10-26 ENCOUNTER — OFFICE VISIT (OUTPATIENT)
Dept: OPHTHALMOLOGY | Facility: CLINIC | Age: 61
End: 2021-10-26
Payer: MEDICARE

## 2021-10-26 DIAGNOSIS — H52.7 REFRACTION DISORDER: ICD-10-CM

## 2021-10-26 DIAGNOSIS — H02.831 DERMATOCHALASIS OF BOTH UPPER EYELIDS: Primary | ICD-10-CM

## 2021-10-26 DIAGNOSIS — H25.12 SENILE NUCLEAR CATARACT, LEFT: ICD-10-CM

## 2021-10-26 DIAGNOSIS — H02.402 PTOSIS OF LEFT EYELID: ICD-10-CM

## 2021-10-26 DIAGNOSIS — H02.834 DERMATOCHALASIS OF BOTH UPPER EYELIDS: Primary | ICD-10-CM

## 2021-10-26 DIAGNOSIS — H25.11 SENILE NUCLEAR CATARACT, RIGHT: ICD-10-CM

## 2021-10-26 PROCEDURE — 99999 PR PBB SHADOW E&M-EST. PATIENT-LVL III: ICD-10-PCS | Mod: PBBFAC,,, | Performed by: OPHTHALMOLOGY

## 2021-10-26 PROCEDURE — 1159F MED LIST DOCD IN RCRD: CPT | Mod: CPTII,S$GLB,, | Performed by: OPHTHALMOLOGY

## 2021-10-26 PROCEDURE — 1159F PR MEDICATION LIST DOCUMENTED IN MEDICAL RECORD: ICD-10-PCS | Mod: CPTII,S$GLB,, | Performed by: OPHTHALMOLOGY

## 2021-10-26 PROCEDURE — 92015 PR REFRACTION: ICD-10-PCS | Mod: S$GLB,,, | Performed by: OPHTHALMOLOGY

## 2021-10-26 PROCEDURE — 1160F RVW MEDS BY RX/DR IN RCRD: CPT | Mod: CPTII,S$GLB,, | Performed by: OPHTHALMOLOGY

## 2021-10-26 PROCEDURE — 1160F PR REVIEW ALL MEDS BY PRESCRIBER/CLIN PHARMACIST DOCUMENTED: ICD-10-PCS | Mod: CPTII,S$GLB,, | Performed by: OPHTHALMOLOGY

## 2021-10-26 PROCEDURE — 92014 PR EYE EXAM, EST PATIENT,COMPREHESV: ICD-10-PCS | Mod: S$GLB,,, | Performed by: OPHTHALMOLOGY

## 2021-10-26 PROCEDURE — 92014 COMPRE OPH EXAM EST PT 1/>: CPT | Mod: S$GLB,,, | Performed by: OPHTHALMOLOGY

## 2021-10-26 PROCEDURE — 99999 PR PBB SHADOW E&M-EST. PATIENT-LVL III: CPT | Mod: PBBFAC,,, | Performed by: OPHTHALMOLOGY

## 2021-10-26 PROCEDURE — 92015 DETERMINE REFRACTIVE STATE: CPT | Mod: S$GLB,,, | Performed by: OPHTHALMOLOGY

## 2021-10-26 RX ORDER — ATORVASTATIN CALCIUM 40 MG/1
1 TABLET, FILM COATED ORAL NIGHTLY
COMMUNITY
Start: 2021-06-10

## 2021-10-26 RX ORDER — ICOSAPENT ETHYL 500 MG/1
2 CAPSULE ORAL 2 TIMES DAILY
COMMUNITY
Start: 2021-03-22

## 2021-10-26 RX ORDER — AMIODARONE HYDROCHLORIDE 200 MG/1
300 TABLET ORAL 2 TIMES DAILY
COMMUNITY
Start: 2021-08-04

## 2021-10-26 RX ORDER — GAUZE BANDAGE 4" X 4"
BANDAGE TOPICAL
COMMUNITY

## 2021-10-26 RX ORDER — ALBUTEROL SULFATE 90 UG/1
2 AEROSOL, METERED RESPIRATORY (INHALATION) EVERY 6 HOURS PRN
COMMUNITY
Start: 2021-09-09

## (undated) DEVICE — DRAPE ARM DAVINCI XI

## (undated) DEVICE — ELECTRODE REM PLYHSV RETURN 9

## (undated) DEVICE — BAG TISSUE RETRIEVAL 5MM

## (undated) DEVICE — OBTURATOR BLADELESS 8MM XI CLR

## (undated) DEVICE — GLOVE PROTEXIS HYDROGEL SZ7

## (undated) DEVICE — POSITIONER HEAD DONUT 9IN FOAM

## (undated) DEVICE — SEE MEDLINE ITEM 157131

## (undated) DEVICE — TUBING HEATED INSUFFLATOR

## (undated) DEVICE — GLOVE 7.0 PROTEXIS PI BLUE

## (undated) DEVICE — SYR 3CC LUER LOC

## (undated) DEVICE — NDL INSUF ULTRA VERESS 120MM

## (undated) DEVICE — EVACUATOR KIT SMOKE PLUME AWAY

## (undated) DEVICE — SEAL UNIVERSAL 5MM-8MM XI

## (undated) DEVICE — SEE MEDLINE ITEM 157117

## (undated) DEVICE — NDL SAFETY 22G X 1.5 ECLIPSE

## (undated) DEVICE — CLIP HEMO-LOK ML

## (undated) DEVICE — DRAPE COLUMN DAVINCI XI

## (undated) DEVICE — BAG TISS RETRV MONARCH 10MM

## (undated) DEVICE — MANIFOLD 4 PORT

## (undated) DEVICE — SUT VICRYL PLUS 4-0 P3 18IN

## (undated) DEVICE — DECANTER VIAL ASEPTIC TRANSFER

## (undated) DEVICE — SUPPORT ULNA NERVE PROTECTOR

## (undated) DEVICE — SOL NS 1000CC

## (undated) DEVICE — SOL STRL WATER INJ 1000ML BG

## (undated) DEVICE — COVER TIP CURVED SCISSORS XI

## (undated) DEVICE — APPLICATOR CHLORAPREP ORN 26ML

## (undated) DEVICE — DRAPE ABDOMINAL TIBURON 14X11

## (undated) DEVICE — SEE MEDLINE ITEM 152622

## (undated) DEVICE — ADHESIVE MASTISOL VIAL 48/BX

## (undated) DEVICE — NDL PNEUMO INSUFFLATI 120MM

## (undated) DEVICE — SYR 30CC LUER LOCK

## (undated) DEVICE — SEE MEDLINE ITEM 152680

## (undated) DEVICE — KIT ANTIFOG

## (undated) DEVICE — CLOSURE SKIN STERI STRIP 1/2X4

## (undated) DEVICE — COVER OVERHEAD SURG LT BLUE

## (undated) DEVICE — SEE MEDLINE ITEM 157027